# Patient Record
Sex: FEMALE | Race: OTHER | URBAN - METROPOLITAN AREA
[De-identification: names, ages, dates, MRNs, and addresses within clinical notes are randomized per-mention and may not be internally consistent; named-entity substitution may affect disease eponyms.]

---

## 2024-03-19 ENCOUNTER — APPOINTMENT (OUTPATIENT)
Dept: LAB | Facility: HOSPITAL | Age: 39
End: 2024-03-19
Payer: COMMERCIAL

## 2024-03-19 DIAGNOSIS — Z00.00 ROUTINE GENERAL MEDICAL EXAMINATION AT A HEALTH CARE FACILITY: ICD-10-CM

## 2024-03-19 PROCEDURE — 87491 CHLMYD TRACH DNA AMP PROBE: CPT

## 2024-03-19 PROCEDURE — 86480 TB TEST CELL IMMUN MEASURE: CPT

## 2024-03-19 PROCEDURE — 87591 N.GONORRHOEAE DNA AMP PROB: CPT

## 2024-03-19 PROCEDURE — 36415 COLL VENOUS BLD VENIPUNCTURE: CPT

## 2024-03-19 PROCEDURE — 86780 TREPONEMA PALLIDUM: CPT

## 2024-03-20 LAB
C TRACH DNA SPEC QL NAA+PROBE: NEGATIVE
GAMMA INTERFERON BACKGROUND BLD IA-ACNC: 0.04 IU/ML
M TB IFN-G BLD-IMP: NEGATIVE
M TB IFN-G CD4+ BCKGRND COR BLD-ACNC: 0.01 IU/ML
M TB IFN-G CD4+ BCKGRND COR BLD-ACNC: 0.03 IU/ML
MITOGEN IGNF BCKGRD COR BLD-ACNC: 9.96 IU/ML
N GONORRHOEA DNA SPEC QL NAA+PROBE: POSITIVE
TREPONEMA PALLIDUM IGG+IGM AB [PRESENCE] IN SERUM OR PLASMA BY IMMUNOASSAY: NORMAL

## 2024-10-15 ENCOUNTER — OFFICE VISIT (OUTPATIENT)
Age: 39
End: 2024-10-15
Payer: COMMERCIAL

## 2024-10-15 VITALS
RESPIRATION RATE: 18 BRPM | HEIGHT: 63 IN | DIASTOLIC BLOOD PRESSURE: 56 MMHG | WEIGHT: 162.4 LBS | OXYGEN SATURATION: 99 % | HEART RATE: 87 BPM | SYSTOLIC BLOOD PRESSURE: 110 MMHG | BODY MASS INDEX: 28.77 KG/M2

## 2024-10-15 DIAGNOSIS — Z02.4 DRIVER'S PERMIT PE (PHYSICAL EXAMINATION): Primary | ICD-10-CM

## 2024-10-15 NOTE — PROGRESS NOTES
[facility]    NAME: Yvette Siddiqui is a 39 y.o. female  : 1985    MRN: [id number]  DATE: October 15, 2024  TIME: 12:50 PM    Assessment and Plan   's permit PE (physical examination) [Z02.4]  1. 's permit PE (physical examination)          Eye exam meets requirements.  Paperwork filled out.  Educated to follow up with primary care doctor for other concerns.     Patient Instructions     Bring filled out paperwork with you to the [employer].  Follow up with primary doctor for other concerns - if you need a primary doctor can follow up at the office next to the urgent care. To make an appointment call [phone].    Chief Complaint     Chief Complaint   Patient presents with    Permit          History of Present Illness       Presents for permit physical. Denies past medical issues. Specifically denies history of cardiac problems, neurological disorders, seizures, hypertension, diabetes, or alcohol/drug use.         Review of Systems   Review of Systems   Constitutional:  Negative for chills and fever.   HENT:  Negative for congestion and sore throat.    Eyes:  Negative for visual disturbance.   Respiratory:  Negative for cough and shortness of breath.    Cardiovascular:  Negative for chest pain.   Gastrointestinal:  Negative for abdominal pain.   Musculoskeletal:  Negative for myalgias.   Neurological:  Negative for seizures.   Psychiatric/Behavioral:  Negative for confusion.          Current Medications     No current outpatient medications on file.    Current Allergies     Allergies as of 10/15/2024    (Not on File)            The following portions of the patient's history were reviewed and updated as appropriate: allergies, current medications, past family history, past medical history, past social history, past surgical history and problem list.     No past medical history on file.    No past surgical history on file.    No family history on file.      Medications have been  "verified.        Objective   /56   Pulse 87   Resp 18   Ht 5' 3\" (1.6 m)   Wt 73.7 kg (162 lb 6.4 oz)   SpO2 99%   BMI 28.77 kg/m²        Physical Exam     Physical Exam  Vitals reviewed.   Constitutional:       General: She is not in acute distress.     Appearance: Normal appearance.   HENT:      Right Ear: Tympanic membrane, ear canal and external ear normal.      Left Ear: Tympanic membrane, ear canal and external ear normal.      Nose: Nose normal.      Mouth/Throat:      Mouth: Mucous membranes are moist.      Pharynx: No posterior oropharyngeal erythema.   Eyes:      Conjunctiva/sclera: Conjunctivae normal.   Cardiovascular:      Rate and Rhythm: Normal rate and regular rhythm.      Pulses: Normal pulses.      Heart sounds: Normal heart sounds. No murmur heard.  Pulmonary:      Effort: Pulmonary effort is normal. No respiratory distress.      Breath sounds: Normal breath sounds.   Musculoskeletal:         General: Normal range of motion.   Skin:     General: Skin is warm and dry.   Neurological:      General: No focal deficit present.      Mental Status: She is alert and oriented to person, place, and time.      Sensory: Sensation is intact.      Motor: Motor function is intact.      Coordination: Coordination is intact.      Gait: Gait is intact.   Psychiatric:         Mood and Affect: Mood normal.         Behavior: Behavior normal.                    "

## 2024-11-25 LAB
EXTERNAL HEPATITIS B SURFACE ANTIGEN: NON REACTIVE
EXTERNAL HIV-1/2 AB-AG: NORMAL
EXTERNAL RUBELLA IGG QUANTITATION: NORMAL
EXTERNAL SYPHILIS TOTAL IGG/IGM SCREENING: NON REACTIVE
HCV AB SER-ACNC: NON REACTIVE

## 2025-02-12 ENCOUNTER — OFFICE VISIT (OUTPATIENT)
Dept: URGENT CARE | Facility: MEDICAL CENTER | Age: 40
End: 2025-02-12
Payer: COMMERCIAL

## 2025-02-12 VITALS
OXYGEN SATURATION: 96 % | SYSTOLIC BLOOD PRESSURE: 132 MMHG | DIASTOLIC BLOOD PRESSURE: 75 MMHG | BODY MASS INDEX: 30.47 KG/M2 | RESPIRATION RATE: 18 BRPM | WEIGHT: 172 LBS | HEART RATE: 100 BPM | TEMPERATURE: 97.9 F

## 2025-02-12 DIAGNOSIS — J01.40 ACUTE PANSINUSITIS, RECURRENCE NOT SPECIFIED: ICD-10-CM

## 2025-02-12 DIAGNOSIS — J34.89 PURULENT RHINORRHEA: Primary | ICD-10-CM

## 2025-02-12 PROBLEM — Z3A.25 25 WEEKS GESTATION OF PREGNANCY: Status: ACTIVE | Noted: 2024-12-27

## 2025-02-12 PROBLEM — Z98.891 HISTORY OF CESAREAN DELIVERY: Status: ACTIVE | Noted: 2024-11-27

## 2025-02-12 PROBLEM — A54.9 GONORRHEA: Status: ACTIVE | Noted: 2025-02-12

## 2025-02-12 PROBLEM — Z13.32 ENCOUNTER FOR SCREENING FOR MATERNAL DEPRESSION: Status: ACTIVE | Noted: 2024-11-27

## 2025-02-12 PROBLEM — O09.529 ANTEPARTUM MULTIGRAVIDA OF ADVANCED MATERNAL AGE: Status: ACTIVE | Noted: 2024-11-27

## 2025-02-12 PROBLEM — Z30.09 ENCOUNTER FOR CONSULTATION FOR FEMALE STERILIZATION: Status: ACTIVE | Noted: 2024-12-27

## 2025-02-12 PROCEDURE — S9083 URGENT CARE CENTER GLOBAL: HCPCS

## 2025-02-12 PROCEDURE — G0382 LEV 3 HOSP TYPE B ED VISIT: HCPCS

## 2025-02-12 RX ORDER — AMOXICILLIN 875 MG/1
875 TABLET, COATED ORAL 2 TIMES DAILY
Qty: 14 TABLET | Refills: 0 | Status: SHIPPED | OUTPATIENT
Start: 2025-02-12 | End: 2025-02-20

## 2025-02-12 NOTE — PROGRESS NOTES
[facility]        NAME: Yvette Siddiqui is a 39 y.o. female  : 1985    MRN: [id number]  DATE: 2025  TIME: 2:54 PM    Assessment and Plan   Purulent rhinorrhea [J34.89]  1. Purulent rhinorrhea  amoxicillin (AMOXIL) 875 mg tablet      2. Acute pansinusitis, recurrence not specified  amoxicillin (AMOXIL) 875 mg tablet            Patient Instructions   Reassurance provided that Yvette Siddiqui lungs are clear on examination today.    Majority of cases are viral and will not require antibiotic treatment. Given length of symptoms will treat.     Take antibiotics as prescribed. Complete entire course of antibiotics even if feeling better.     Eat yogurt with live and active cultures and/or take a probiotic at least 3 hours before or after antibiotic dose. Monitor stool for diarrhea and/or blood. If this occurs, contact primary care doctor ASAP.     Encourage fluids  Rest    Nasal saline spray, sinus rinse or neti-pot    Rhinocort, Flonase or Nasonex as directed on packaging    OTC Zytrec or Claritin    Guaifenesin    Delsym, Robitussin, Robitussin DM    Recommend the following options: room humidifier, vicks vapo rub, hot/steamy shower (practice proper safety precautions when handing hot liquids/steam)  Offer fluids frequently to help with hydration, as staying hydrated helps loosen up thick mucous.   May drink warm water with honey. May use lemon.    Warm compresses over sinuses  Tylenol for pain/fever.    Encourage coughing into the elbow instead of the hand.   Washing hands frequently with warm water and soap may help stop spread of infection.    If symptoms do not improve, please follow with PCP in 3-5 days for further evaluation.  Proceed to  ER if symptoms worsen.    If tests are performed, our office will contact you with results only if changes need to made to the care plan discussed with you at the visit. [facility]    Chief Complaint      Chief Complaint   Patient presents with    Sinus Problem     Pt. With sinus pressure and congestion for the past 5 days. No fevers. Pt. Is 31 weeks pregnant.          History of Present Illness       Sinus Problem  This is a new problem. The current episode started in the past 7 days. The problem has been gradually worsening since onset. There has been no fever. Associated symptoms include congestion, headaches, sinus pressure and a sore throat. Pertinent negatives include no shortness of breath. Past treatments include saline nose sprays.       Review of Systems   Review of Systems   HENT:  Positive for congestion, sinus pressure and sore throat.    Respiratory:  Negative for shortness of breath.    Neurological:  Positive for headaches.         Current Medications       Current Outpatient Medications:     amoxicillin (AMOXIL) 875 mg tablet, Take 1 tablet (875 mg total) by mouth 2 (two) times a day for 7 days, Disp: 14 tablet, Rfl: 0    Ferrous Sulfate (IRON PO), Take 1 tablet by mouth daily, Disp: , Rfl:     Current Allergies     Allergies as of 02/12/2025    (No Known Allergies)            The following portions of the patient's history were reviewed and updated as appropriate: allergies, current medications, past family history, past medical history, past social history, past surgical history and problem list.     History reviewed. No pertinent past medical history.    History reviewed. No pertinent surgical history.    History reviewed. No pertinent family history.      Medications have been verified.        Objective   /75   Pulse 100   Temp 97.9 °F (36.6 °C)   Resp 18   Wt 78 kg (172 lb)   SpO2 96%   BMI 30.47 kg/m²        Physical Exam     Physical Exam               Infiltration, Once PRN, Nancy Solo MD    metroNIDAZOLE (FLAGYL) IVPB (premix) 500 mg 100 mL, 500 mg, Intravenous, BID, Delfina Lucio MD, Stopped at 25 183    [COMPLETED] NIFEdipine (PROCARDIA) capsule 30 mg, 30 mg, Oral, Once, 30 mg at 25 0131 **FOLLOWED BY** NIFEdipine (PROCARDIA) capsule 10 mg, 10 mg, Oral, Q6H, Delfina Lucio MD    sodium chloride 0.9 % bolus 1,000 mL, 1,000 mL, Intravenous, Once, Delfina Lucio MD    sodium chloride 0.9 % infusion, 125 mL/hr, Intravenous, Continuous, Nancy Solo MD, Last Rate: 125 mL/hr at 25 1006, 125 mL/hr at 25 1006    Current Allergies     Allergies as of 2025    (No Known Allergies)            The following portions of the patient's history were reviewed and updated as appropriate: allergies, current medications, past family history, past medical history, past social history, past surgical history and problem list.     History reviewed. No pertinent past medical history.    Past Surgical History:   Procedure Laterality Date    APPENDECTOMY       SECTION      GALLBLADDER SURGERY      KNEE SURGERY Right        Family History   Problem Relation Age of Onset    Breast cancer Neg Hx     Colon cancer Neg Hx     Ovarian cancer Neg Hx          Medications have been verified.        Objective   /75   Pulse 100   Temp 97.9 °F (36.6 °C)   Resp 18   Wt 78 kg (172 lb)   SpO2 96%   BMI 30.47 kg/m²        Physical Exam     Physical Exam  Vitals and nursing note reviewed.   Constitutional:       General: She is not in acute distress.     Appearance: Normal appearance. She is not ill-appearing, toxic-appearing or diaphoretic.   HENT:      Head: Normocephalic.      Right Ear: Tympanic membrane, ear canal and external ear normal. There is no impacted cerumen.      Left Ear: Tympanic membrane, ear canal and external ear normal. There is no impacted cerumen.      Nose: Mucosal edema, congestion and rhinorrhea present.  Rhinorrhea is purulent.      Right Turbinates: Enlarged and swollen.      Left Turbinates: Enlarged and swollen.      Right Sinus: Maxillary sinus tenderness and frontal sinus tenderness present.      Left Sinus: Maxillary sinus tenderness and frontal sinus tenderness present.      Mouth/Throat:      Mouth: Mucous membranes are moist.      Pharynx: Posterior oropharyngeal erythema (minimal) present.   Cardiovascular:      Rate and Rhythm: Normal rate and regular rhythm.      Pulses: Normal pulses.      Heart sounds: Normal heart sounds. No murmur heard.  Pulmonary:      Effort: Pulmonary effort is normal. No respiratory distress.      Breath sounds: Normal breath sounds. No stridor. No wheezing, rhonchi or rales.   Chest:      Chest wall: No tenderness.   Musculoskeletal:         General: Normal range of motion.   Lymphadenopathy:      Cervical: No cervical adenopathy.   Skin:     General: Skin is warm.   Neurological:      Mental Status: She is alert.

## 2025-02-12 NOTE — PATIENT INSTRUCTIONS
Reassurance provided that Yvette Siddiqui lungs are clear on examination today.    Majority of cases are viral and will not require antibiotic treatment. Given length of symptoms will treat.     Take antibiotics as prescribed. Complete entire course of antibiotics even if feeling better.     Eat yogurt with live and active cultures and/or take a probiotic at least 3 hours before or after antibiotic dose. Monitor stool for diarrhea and/or blood. If this occurs, contact primary care doctor ASAP.     Encourage fluids  Rest    Nasal saline spray, sinus rinse or neti-pot    Rhinocort, Flonase or Nasonex as directed on packaging    OTC Zytrec or Claritin    Guaifenesin    Delsym, Robitussin, Robitussin DM    Recommend the following options: room humidifier, vicks vapo rub, hot/steamy shower (practice proper safety precautions when handing hot liquids/steam)  Offer fluids frequently to help with hydration, as staying hydrated helps loosen up thick mucous.   May drink warm water with honey. May use lemon.    Warm compresses over sinuses  Tylenol for pain/fever.    Encourage coughing into the elbow instead of the hand.   Washing hands frequently with warm water and soap may help stop spread of infection.    If symptoms do not improve, please follow with PCP in 3-5 days for further evaluation.  Proceed to  ER if symptoms worsen.    If tests are performed, our office will contact you with results only if changes need to made to the care plan discussed with you at the visit. You can review your full results on [facility].

## 2025-02-19 PROBLEM — Z3A.30 30 WEEKS GESTATION OF PREGNANCY: Status: ACTIVE | Noted: 2024-12-27

## 2025-02-19 NOTE — PROGRESS NOTES
Diagnoses and all orders for this visit:    Encounter to establish care  -     Ambulatory Referral to Maternal Fetal Medicine; Future  -     POCT urine dip    Encounter for supervision of normal first pregnancy in third trimester    History of  delivery    BV (bacterial vaginosis)  -     metroNIDAZOLE (METROGEL) 0.75 % vaginal gel; Insert 1 Application into the vagina daily at bedtime for 5 days  -     POCT wet mount    Need for Tdap vaccination  -     TDAP VACCINE GREATER THAN OR EQUAL TO 8YO IM    Other orders  -     Breast Pump      Results for orders placed or performed in visit on 25   Breast Pump   Result Value Ref Range    Supplier Name [employer]     Supplier Phone Number [phone]     Order Status Supplier Submitted     Delivery Note      Delivery Request Date 2025     Item Description Breast Pump    POCT wet mount   Result Value Ref Range    Yeast, Wet Prep Neg     pH 5.0     Whiff Test Pos     Clue Cells Pos     Trich, Wet Prep Neg        Return for OB intake, PN1, needs delivery consents signed, MA 31 form for tubal signed     See after visit summary for further information and recommendations to the above mentioned subjects which we may or may not have covered in detail during your visit     Subjective    CC: Transfer of care from [facility] @ 30 weeks LMP 24     Yvette Siddiqui is a 39 y.o. female  presents to establish care, transfering in from [facility] at  33 weeks of pregnancy , 3 previous C sections desires Tubal with this delivery   She is from [facility]  All prenatal labs completed and normal at [facility], 28 week labs were done at 25 weeks NML   20 week Anatomy scan completed at [facility] 25 ANA 25, O+ from [facility]  Last Pap 24 Neg  Hx of gonorrhea tx 2024  Treated for BV in Dec ,still has itching and odor  Taking Iron   Tdap given today   Will Breast feed this baby   Dopplers today 154    No LMP recorded. Patient is pregnant.    Vitals:     "02/20/25 1025   BP: 122/82   BP Location: Left arm   Patient Position: Sitting   Cuff Size: Standard   Weight: 76.7 kg (169 lb)   Height: 5' 3\" (1.6 m)     Body mass index is 29.94 kg/m².    Review of Systems     Constitutional: Negative for chills, fatigue, fever, headaches, visual disturbances, and unexpected weight change.   Respiratory: Negative for cough, & shortness of breath.  Cardiovascular: Negative for chest pain. .    Gastrointestinal: Negative for Abd pain, nausea & vomiting, constipation and diarrhea.   Genitourinary: Negative for difficulty urinating, dysuria, hematuria, dyspareunia, unusual vaginal bleeding   Skin: Negative skin changes    Physical Exam     Constitutional: Alert & Oriented x3, well-developed and well-nourished. No distress.   HENT: Atraumatic, Normocephalic,   Neck: Normal range of motion.   Pulmonary: Effort normal.   Abdominal: Soft. No tenderness or masses  Musculoskeletal: Normal ROM  Skin: Warm & Dry  Psychological: Normal mood, thought content, behavior & judgement       Pelvic exam was performed with patient supine, lithotomy position.      Labia: Negative rash, tenderness, lesion or injury on the right labia.              Negative rash, tenderness, lesion or injury on the left labia.   Urethral meatus:  Negative for  tenderness, inflammation or discharge.   Uterus: not deviated, enlarged, fixed or tender.   Cervix: no discharge or friability.   Right adnexa: no mass, no tenderness and no fullness.  Left adnexa: no mass, no tenderness and no fullness.   Vagina: No erythema, tenderness, masses, or foreign body in the vagina. No signs of injury around the vagina. White milky vaginal discharge   Perineum without lesions, signs of injury, erythema or swelling.  Inguinal Canal:        Right: No inguinal adenopathy or hernia present.        Left: No inguinal adenopathy or hernia present.             "

## 2025-02-20 ENCOUNTER — ULTRASOUND (OUTPATIENT)
Dept: OBGYN CLINIC | Facility: CLINIC | Age: 40
End: 2025-02-20
Payer: COMMERCIAL

## 2025-02-20 VITALS
BODY MASS INDEX: 29.95 KG/M2 | SYSTOLIC BLOOD PRESSURE: 122 MMHG | HEIGHT: 63 IN | WEIGHT: 169 LBS | DIASTOLIC BLOOD PRESSURE: 82 MMHG

## 2025-02-20 DIAGNOSIS — B96.89 BV (BACTERIAL VAGINOSIS): ICD-10-CM

## 2025-02-20 DIAGNOSIS — N76.0 BV (BACTERIAL VAGINOSIS): ICD-10-CM

## 2025-02-20 DIAGNOSIS — Z11.3 SCREENING FOR STD (SEXUALLY TRANSMITTED DISEASE): ICD-10-CM

## 2025-02-20 DIAGNOSIS — Z34.03 ENCOUNTER FOR SUPERVISION OF NORMAL FIRST PREGNANCY IN THIRD TRIMESTER: ICD-10-CM

## 2025-02-20 DIAGNOSIS — Z76.89 ENCOUNTER TO ESTABLISH CARE: Primary | ICD-10-CM

## 2025-02-20 DIAGNOSIS — Z3A.33 33 WEEKS GESTATION OF PREGNANCY: ICD-10-CM

## 2025-02-20 DIAGNOSIS — Z23 NEED FOR TDAP VACCINATION: ICD-10-CM

## 2025-02-20 DIAGNOSIS — Z98.891 HISTORY OF CESAREAN DELIVERY: ICD-10-CM

## 2025-02-20 LAB
BV WHIFF TEST VAG QL: ABNORMAL
CLUE CELLS SPEC QL WET PREP: ABNORMAL
PH SMN: 5 [PH]
SL AMB  POCT GLUCOSE, UA: NEGATIVE
SL AMB POCT URINE PROTEIN: POSITIVE
T VAGINALIS VAG QL WET PREP: ABNORMAL
YEAST VAG QL WET PREP: ABNORMAL

## 2025-02-20 PROCEDURE — 90715 TDAP VACCINE 7 YRS/> IM: CPT | Performed by: OBSTETRICS & GYNECOLOGY

## 2025-02-20 PROCEDURE — 87591 N.GONORRHOEAE DNA AMP PROB: CPT | Performed by: OBSTETRICS & GYNECOLOGY

## 2025-02-20 PROCEDURE — 87210 SMEAR WET MOUNT SALINE/INK: CPT | Performed by: OBSTETRICS & GYNECOLOGY

## 2025-02-20 PROCEDURE — 87491 CHLMYD TRACH DNA AMP PROBE: CPT | Performed by: OBSTETRICS & GYNECOLOGY

## 2025-02-20 PROCEDURE — 90471 IMMUNIZATION ADMIN: CPT | Performed by: OBSTETRICS & GYNECOLOGY

## 2025-02-20 PROCEDURE — 81002 URINALYSIS NONAUTO W/O SCOPE: CPT | Performed by: OBSTETRICS & GYNECOLOGY

## 2025-02-20 PROCEDURE — 99204 OFFICE O/P NEW MOD 45 MIN: CPT | Performed by: OBSTETRICS & GYNECOLOGY

## 2025-02-20 RX ORDER — METRONIDAZOLE 7.5 MG/G
1 GEL VAGINAL
Qty: 45 G | Refills: 0 | Status: ON HOLD | OUTPATIENT
Start: 2025-02-20 | End: 2025-02-25

## 2025-02-20 NOTE — PROGRESS NOTES
Pt is currently pregnant. Here to establish care with [facility], transfer from [place]   , csection x3   33wks   LMP 24  ANA 4/10/25  No LOF,VB,Contractions   +FM   Urine trace protein/neg glucose   UTD flu vaccine   Tdap given today/pt tolerated VIS given   Pn1/28wks labs completed   O+   Last Pap 24 NILM/HPV -  GC/CHL neg/neg 24   Recollected today patient experiencing discharge and odor   Breast Pump ordered today   3 prior csections interested in tubal ,MA31 signed w/ [facility] on  will sign with our office   Blue folder given/reviewed w/ patient

## 2025-02-20 NOTE — PATIENT INSTRUCTIONS
Patient Education     Pregnancy - The Eighth Month   About this topic   It is important for you to learn how to take care of yourself to help you have a healthy baby and safe delivery. It is good to have health care throughout your pregnancy.  The eighth month of your pregnancy starts around week 33 and lasts through week 36. By knowing how far along you are, you can learn what is normal for this stage of your pregnancy and plan for what is next.  General   Your body   During the eighth month of your pregnancy, here are some things you can expect.  You may:  Be feeling more tired. Rest as much as you can and take small naps if possible. This also helps with swollen feet and ankles.  Feel hot all the time. Be sure to drink plenty of water.  Notice your baby drops more into your pelvis. This makes breathing a bit easier, but you may have to go to the bathroom more often. You may also notice more problems with hemorrhoids.  Have more back pain  Notice clear jelly-like substance flecked with blood when you use the restroom. This is your mucus plug.  Feel less steady on your feet. This is because your hips and joints are preparing for birth.  Be tested for Group Beta Strep (GBS) to see if you will need antibiotics during labor  Gain about 1/2 to 1 pound (.23 to .45 kg) a week for the rest of your pregnancy. It is normal to gain about 5 to 10 pounds (2.3 to 4.5 kg) total in your first 4 months.  Have a BPP, or Biophysical Profile. The doctors do an ultrasound to check your baby’s health if you are at high risk or having problems.  Have a NST, or Non Stress Test. The staff place special monitors around your belly to check the baby’s heart rate and look for contractions.  Your baby's growth and development:  Your baby is almost fully mature but will spend the rest of the time inside of you getting bigger.  Their lungs are the last organ to mature, so it is important that your baby stays in your womb until your due date if  possible.  Their bones are getting stronger each day. The bones in their skull stay a little softer to make delivery easier.  They are able to scratch themselves as their fingernails are developed.  Your baby is becoming protected from germs as they develop antibodies.  They are moving a little less because there is less room.  Your baby is about 19 inches (48 cm) long and weighs about 6 pounds (2,700 gm). Your baby is about the size of a papaya or pineapple.  Things to Think About   Avoid alcohol, drugs, tobacco products, and second hand smoke.  Be sure you know the signs of labor and when to call your doctor.  Are you planning a natural childbirth or thinking about an epidural? Know things you can do to help cope with labor pain.  You may want to learn about cord blood banking.  Do you have everything you need for after the baby is born? Be sure the car seat fits in the car.  When do I need to call the doctor?   Contractions every 10 minutes or more often that do not go away with drinking water or position changes  Headache that does not go away; blurry vision; seeing spots or halos; increase in swelling in your hands, feet, or face; and pain under your ribs on the right side  Low, dull back pain that does not go away  Pressure in your pelvis that feels like your baby is pushing down  A gush or constant trickle of watery or bloody fluid leaking from your vagina  Little to no movement felt by baby in 2 hours. Your baby should be moving at least 10 times every 2 hours.  Vaginal bleeding with or without pain  Fever of 100.4°F (38°C) or higher  After a car accident, fall, or any trauma to your belly  Having thoughts of harming yourself or others, or do not feel safe at home  Last Reviewed Date   2020-05-06  Consumer Information Use and Disclaimer   This generalized information is a limited summary of diagnosis, treatment, and/or medication information. It is not meant to be comprehensive and should be used as a tool  "to help the user understand and/or assess potential diagnostic and treatment options. It does NOT include all information about conditions, treatments, medications, side effects, or risks that may apply to a specific patient. It is not intended to be medical advice or a substitute for the medical advice, diagnosis, or treatment of a health care provider based on the health care provider's examination and assessment of a patient’s specific and unique circumstances. Patients must speak with a health care provider for complete information about their health, medical questions, and treatment options, including any risks or benefits regarding use of medications. This information does not endorse any treatments or medications as safe, effective, or approved for treating a specific patient. UpToDate, Inc. and its affiliates disclaim any warranty or liability relating to this information or the use thereof. The use of this information is governed by the Terms of Use, available at https://www.[employer]/en/know/clinical-effectiveness-terms   Copyright   Copyright © 2024 UpToDate, Inc. and its affiliates and/or licensors. All rights reserved.  Patient Education     Your baby's movement before birth   The Basics   Written by the doctors and editors at [email or web address]   When should I start feeling my baby move? -- It depends. Most people first feel their baby moving in the uterus between about 16 and 20 weeks of pregnancy. It might take longer to feel movement if this is your first pregnancy or if the placenta is in the front of your uterus.  What kinds of movements should I feel? -- When you first feel your baby move, it might feel like a gentle flutter in your belly. This is sometimes called \"quickening.\" As the baby grows, their movements will get stronger. You will probably feel them kicking, rolling, and stretching. Later in pregnancy, you might be able to see and feel the baby moving from the outside.  You might notice " "that your baby is more active at certain times of the day or night. Even before birth, babies have periods of being asleep and awake. When your baby is sleeping, you might notice that they do not move as much.  Should I keep track of my baby's movements? -- If your pregnancy is healthy, you probably do not need to count or record your baby's movements. Feeling regular movement is a good sign that the baby is doing well.  In some cases, your doctor or midwife might ask you to keep track of your baby's movements. If so, they will tell you how to do this and when to call them.  A change in your baby's movements does not always mean that there is a problem. But in some cases, it can be a sign that the baby is having trouble. If your doctor or midwife is concerned, they can do tests to check on the baby.  If I am asked to track movement, how should I do it? -- There are different ways of tracking your baby's movement. This is sometimes called \"kick counting.\"  Your doctor or midwife will tell you exactly what to track. For example, they might ask you to write down:   How long it takes to feel 10 kicks or movements   How many times your baby moves in 1 hour  Many experts consider at least 10 movements in 2 hours to be a sign that the baby is doing well. But there is no specific cutoff for exactly how much movement is healthy or unhealthy. Some babies are more active than others, and some pregnant people feel movement more easily than others. The main goal of kick counting is to get to know your baby's normal patterns so you can tell if anything changes.  If you are doing kick counting:   Choose a time of day when your baby is usually active.   Find a quiet place where you will not be distracted.   Lie down on your side in a comfortable position.   Check the clock, or set a timer.   Each time you feel your baby move or kick, write down the time. Some people use a smartphone annel to keep track.   If your baby seems less " active than usual, try moving around, eating a snack, and emptying your bladder. This can help wake the baby up if they are asleep.   Stop counting after you have felt 10 kicks, or after the length of time your doctor or midwife told you.  When should I call the doctor? -- Call your doctor or midwife for advice if:   You have concerns about your baby's movement.   Your baby is moving less than they normally do.   You notice a sudden change in the pattern of your baby's movements.   You have any other symptoms that worry you.  All topics are updated as new evidence becomes available and our peer review process is complete.  This topic retrieved from [email or web address] on: Feb 26, 2024.  Topic 019705 Version 1.0  Release: 32.2.4 - C32.56  © 2024 UpToDate, Inc. and/or its affiliates. All rights reserved.  Consumer Information Use and Disclaimer   Disclaimer: This generalized information is a limited summary of diagnosis, treatment, and/or medication information. It is not meant to be comprehensive and should be used as a tool to help the user understand and/or assess potential diagnostic and treatment options. It does NOT include all information about conditions, treatments, medications, side effects, or risks that may apply to a specific patient. It is not intended to be medical advice or a substitute for the medical advice, diagnosis, or treatment of a health care provider based on the health care provider's examination and assessment of a patient's specific and unique circumstances. Patients must speak with a health care provider for complete information about their health, medical questions, and treatment options, including any risks or benefits regarding use of medications. This information does not endorse any treatments or medications as safe, effective, or approved for treating a specific patient. UpToDate, Inc. and its affiliates disclaim any warranty or liability relating to this information or the use thereof.The use  of this information is governed by the Terms of Use, available at https://www.[employer].com/en/know/clinical-effectiveness-terms. © UpToDate, Inc. and its affiliates and/or licensors. All rights reserved.  Copyright   ©  UpToDate, Inc. and/or its affiliates. All rights reserved.  Patient Education      Birth Discharge Instructions   About this topic    birth is a surgical procedure used to deliver a baby. It is also called a . The baby is taken out through a cut in the belly. A  may be planned or unplanned.     What care is needed at home?   Ask your doctor what you need to do when you go home. Make sure you ask questions if you do not understand what the doctor says. This way you will know what you need to do.  You can most often go home 2 to 4 days after the . It may take 6 weeks before you fully recover.  Talk to your doctor about how to care for your cut site. Ask your doctor about:  When you should change your bandages  How to care for your cut sites  When you may take a bath or shower  If you need to be careful with lifting things over the weight of your baby. Do not lift older children. Let the child climb into your lap.  When you may go back to your normal activities like work, driving, or sex  Always wash your hands before and after touching your cut site.  You can expect some bleeding from your vagina for a few weeks. You may use pads but not tampons.  Your bowel movements may take some time to get back to a normal pace. Eat small meals high in fiber to avoid hard stools. Your doctor may tell you to take stool softeners.  Having a  will not affect your plan to breastfeed. You can start breastfeeding right away.  What follow-up care is needed?   Your doctor may ask you to make visits to the office to check on your progress. Be sure to keep these visits.  You may have stitches or staples at your cut site. Some stitches dissolve on their own.  Others need to be taken out. Talk to your doctor to find out about your stitches or staples. If the doctor used skin glue, the glue will fall off on its own.  What drugs may be needed?   The doctor may order drugs to:  Help with pain  Fight an infection  Soften bowel movements  Help with hemorrhoids  Will physical activity be limited?   You may have to limit your activity. Talk to your doctor about the right amount of activity for you.  What problems could happen?   Infection  Wound opening  Heavy blood loss  Blood clots in your legs or lungs  Upset stomach, throwing up, and very bad headache  Low mood  Cramping and discomfort  When do I need to call the doctor?   Signs of infection. These include a fever of 100.4°F (38°C) or higher, chills, pain with passing urine, wound that will not heal.  Sudden shortness of breath or a sudden onset of chest pain could be a sign that a blood clot has traveled to your lungs. Go to the ER right away.  Signs of wound infection. These include swelling, redness, warmth around the wound; too much pain when touched; yellowish, greenish, or bloody discharge; foul smell coming from the cut site; cut site opens up.  Problems with pain that does not go away or gets worse  Swollen, hard, or painful breasts  You feel very sad or depressed  Problems passing urine or with hard bowel movements  Sudden, large amounts of vaginal bleeding  Swelling in your hands, eyes, or face  Severe headache or vision changes  Helpful tips   Use a small pillow to put pressure on your cut site. This can make you more comfortable when you cough, laugh, or do other actions.  Have people help with house work, cooking, and watching the baby. This will let you get lots of rest.  Make time for you and your partner to be alone and talk.  Make time for you and your partner to enjoy your baby.  Teach Back: Helping You Understand   The Teach Back Method helps you understand the information we are giving you. After you talk  with the staff, tell them in your own words what you learned. This helps to make sure the staff has described each thing clearly. It also helps to explain things that may have been confusing. Before going home, make sure you can do these:  I can tell you about my procedure.  I can tell you how to care for my cut site.  I can tell you what I will do if I have large amounts of vaginal bleeding, swollen or painful breasts, or feel very sad or have a low mood.  Last Reviewed Date   2020-10-12  Consumer Information Use and Disclaimer   This generalized information is a limited summary of diagnosis, treatment, and/or medication information. It is not meant to be comprehensive and should be used as a tool to help the user understand and/or assess potential diagnostic and treatment options. It does NOT include all information about conditions, treatments, medications, side effects, or risks that may apply to a specific patient. It is not intended to be medical advice or a substitute for the medical advice, diagnosis, or treatment of a health care provider based on the health care provider's examination and assessment of a patient’s specific and unique circumstances. Patients must speak with a health care provider for complete information about their health, medical questions, and treatment options, including any risks or benefits regarding use of medications. This information does not endorse any treatments or medications as safe, effective, or approved for treating a specific patient. UpToDate, Inc. and its affiliates disclaim any warranty or liability relating to this information or the use thereof. The use of this information is governed by the Terms of Use, available at https://www.wolterskluwer.com/en/know/clinical-effectiveness-terms   Copyright   Copyright © 2024 UpToDate, Inc. and its affiliates and/or licensors. All rights reserved.

## 2025-02-21 ENCOUNTER — RESULTS FOLLOW-UP (OUTPATIENT)
Dept: OBGYN CLINIC | Facility: CLINIC | Age: 40
End: 2025-02-21

## 2025-02-21 LAB
C TRACH DNA SPEC QL NAA+PROBE: NEGATIVE
DME PARACHUTE DELIVERY DATE REQUESTED: NORMAL
DME PARACHUTE ITEM DESCRIPTION: NORMAL
DME PARACHUTE ORDER STATUS: NORMAL
DME PARACHUTE SUPPLIER NAME: NORMAL
DME PARACHUTE SUPPLIER PHONE: NORMAL
N GONORRHOEA DNA SPEC QL NAA+PROBE: NEGATIVE

## 2025-02-23 ENCOUNTER — HOSPITAL ENCOUNTER (INPATIENT)
Facility: HOSPITAL | Age: 40
LOS: 6 days | Discharge: HOME | End: 2025-03-02
Attending: OBSTETRICS & GYNECOLOGY | Admitting: OBSTETRICS & GYNECOLOGY
Payer: COMMERCIAL

## 2025-02-23 DIAGNOSIS — Z98.891 S/P REPEAT LOW TRANSVERSE C-SECTION: ICD-10-CM

## 2025-02-23 DIAGNOSIS — Z3A.33 33 WEEKS GESTATION OF PREGNANCY: ICD-10-CM

## 2025-02-23 DIAGNOSIS — O42.919 PRETERM PREMATURE RUPTURE OF MEMBRANES (PPROM) WITH UNKNOWN ONSET OF LABOR: ICD-10-CM

## 2025-02-23 DIAGNOSIS — Z98.891 HISTORY OF C-SECTION: ICD-10-CM

## 2025-02-23 DIAGNOSIS — Z98.891 HISTORY OF CESAREAN DELIVERY: Primary | ICD-10-CM

## 2025-02-24 PROBLEM — O42.919 PRETERM PREMATURE RUPTURE OF MEMBRANES (PPROM) DELIVERED, CURRENT HOSPITALIZATION: Status: ACTIVE | Noted: 2025-02-24

## 2025-02-24 PROBLEM — N76.0 VAGINITIS: Status: ACTIVE | Noted: 2025-02-24

## 2025-02-24 PROBLEM — O47.00 PRETERM CONTRACTIONS: Status: ACTIVE | Noted: 2025-02-24

## 2025-02-24 LAB
ABO GROUP BLD: NORMAL
ABO GROUP BLD: NORMAL
BASOPHILS # BLD AUTO: 0.04 THOUSANDS/ÂΜL (ref 0–0.1)
BASOPHILS NFR BLD AUTO: 0 % (ref 0–1)
BLD GP AB SCN SERPL QL: NEGATIVE
C TRACH DNA SPEC QL NAA+PROBE: NEGATIVE
EOSINOPHIL # BLD AUTO: 0.14 THOUSAND/ÂΜL (ref 0–0.61)
EOSINOPHIL NFR BLD AUTO: 2 % (ref 0–6)
ERYTHROCYTE [DISTWIDTH] IN BLOOD BY AUTOMATED COUNT: 13.1 % (ref 11.6–15.1)
HCT VFR BLD AUTO: 33.1 % (ref 34.8–46.1)
HGB BLD-MCNC: 10.7 G/DL (ref 11.5–15.4)
HOLD SPECIMEN: NORMAL
IMM GRANULOCYTES # BLD AUTO: 0.04 THOUSAND/UL (ref 0–0.2)
IMM GRANULOCYTES NFR BLD AUTO: 0 % (ref 0–2)
LYMPHOCYTES # BLD AUTO: 2.2 THOUSANDS/ÂΜL (ref 0.6–4.47)
LYMPHOCYTES NFR BLD AUTO: 23 % (ref 14–44)
MCH RBC QN AUTO: 27.6 PG (ref 26.8–34.3)
MCHC RBC AUTO-ENTMCNC: 32.3 G/DL (ref 31.4–37.4)
MCV RBC AUTO: 85 FL (ref 82–98)
MONOCYTES # BLD AUTO: 0.7 THOUSAND/ÂΜL (ref 0.17–1.22)
MONOCYTES NFR BLD AUTO: 7 % (ref 4–12)
N GONORRHOEA DNA SPEC QL NAA+PROBE: NEGATIVE
NEUTROPHILS # BLD AUTO: 6.3 THOUSANDS/ÂΜL (ref 1.85–7.62)
NEUTS SEG NFR BLD AUTO: 68 % (ref 43–75)
NRBC BLD AUTO-RTO: 0 /100 WBCS
PLATELET # BLD AUTO: 459 THOUSANDS/UL (ref 149–390)
PMV BLD AUTO: 9.1 FL (ref 8.9–12.7)
RBC # BLD AUTO: 3.88 MILLION/UL (ref 3.81–5.12)
RH BLD: POSITIVE
RH BLD: POSITIVE
SPECIMEN EXPIRATION DATE: NORMAL
TREPONEMA PALLIDUM IGG+IGM AB [PRESENCE] IN SERUM OR PLASMA BY IMMUNOASSAY: NORMAL
WBC # BLD AUTO: 9.42 THOUSAND/UL (ref 4.31–10.16)

## 2025-02-24 PROCEDURE — 99223 1ST HOSP IP/OBS HIGH 75: CPT | Performed by: OBSTETRICS & GYNECOLOGY

## 2025-02-24 PROCEDURE — 86901 BLOOD TYPING SEROLOGIC RH(D): CPT

## 2025-02-24 PROCEDURE — 76816 OB US FOLLOW-UP PER FETUS: CPT | Performed by: OBSTETRICS & GYNECOLOGY

## 2025-02-24 PROCEDURE — 86850 RBC ANTIBODY SCREEN: CPT

## 2025-02-24 PROCEDURE — 99222 1ST HOSP IP/OBS MODERATE 55: CPT | Performed by: OBSTETRICS & GYNECOLOGY

## 2025-02-24 PROCEDURE — 86780 TREPONEMA PALLIDUM: CPT

## 2025-02-24 PROCEDURE — 87086 URINE CULTURE/COLONY COUNT: CPT

## 2025-02-24 PROCEDURE — 85025 COMPLETE CBC W/AUTO DIFF WBC: CPT

## 2025-02-24 PROCEDURE — 87150 DNA/RNA AMPLIFIED PROBE: CPT

## 2025-02-24 PROCEDURE — 59025 FETAL NON-STRESS TEST: CPT | Performed by: OBSTETRICS & GYNECOLOGY

## 2025-02-24 PROCEDURE — 87491 CHLMYD TRACH DNA AMP PROBE: CPT

## 2025-02-24 PROCEDURE — 86900 BLOOD TYPING SEROLOGIC ABO: CPT

## 2025-02-24 PROCEDURE — 99214 OFFICE O/P EST MOD 30 MIN: CPT

## 2025-02-24 PROCEDURE — 87591 N.GONORRHOEAE DNA AMP PROB: CPT

## 2025-02-24 PROCEDURE — G0463 HOSPITAL OUTPT CLINIC VISIT: HCPCS

## 2025-02-24 RX ORDER — AMOXICILLIN 250 MG/1
500 CAPSULE ORAL EVERY 8 HOURS SCHEDULED
Status: DISCONTINUED | OUTPATIENT
Start: 2025-02-25 | End: 2025-02-27

## 2025-02-24 RX ORDER — BETAMETHASONE SODIUM PHOSPHATE AND BETAMETHASONE ACETATE 3; 3 MG/ML; MG/ML
12 INJECTION, SUSPENSION INTRA-ARTICULAR; INTRALESIONAL; INTRAMUSCULAR; SOFT TISSUE EVERY 24 HOURS
Status: COMPLETED | OUTPATIENT
Start: 2025-02-24 | End: 2025-02-25

## 2025-02-24 RX ORDER — AMOXICILLIN 250 MG/1
500 CAPSULE ORAL EVERY 8 HOURS SCHEDULED
Status: DISCONTINUED | OUTPATIENT
Start: 2025-02-26 | End: 2025-02-24

## 2025-02-24 RX ORDER — NIFEDIPINE 10 MG/1
10 CAPSULE ORAL EVERY 6 HOURS
Status: DISCONTINUED | OUTPATIENT
Start: 2025-02-24 | End: 2025-02-25

## 2025-02-24 RX ORDER — NIFEDIPINE 10 MG/1
30 CAPSULE ORAL ONCE
Status: COMPLETED | OUTPATIENT
Start: 2025-02-24 | End: 2025-02-24

## 2025-02-24 RX ORDER — SODIUM CHLORIDE 9 MG/ML
125 INJECTION, SOLUTION INTRAVENOUS CONTINUOUS
Status: DISCONTINUED | OUTPATIENT
Start: 2025-02-24 | End: 2025-02-24

## 2025-02-24 RX ORDER — BUPIVACAINE HYDROCHLORIDE 2.5 MG/ML
30 INJECTION, SOLUTION EPIDURAL; INFILTRATION; INTRACAUDAL ONCE AS NEEDED
Status: DISCONTINUED | OUTPATIENT
Start: 2025-02-24 | End: 2025-02-27

## 2025-02-24 RX ORDER — FLUCONAZOLE 100 MG/1
200 TABLET ORAL ONCE
Status: COMPLETED | OUTPATIENT
Start: 2025-02-24 | End: 2025-02-24

## 2025-02-24 RX ORDER — METRONIDAZOLE 500 MG/100ML
500 INJECTION, SOLUTION INTRAVENOUS 2 TIMES DAILY
Status: DISCONTINUED | OUTPATIENT
Start: 2025-02-24 | End: 2025-02-24

## 2025-02-24 RX ORDER — METRONIDAZOLE 500 MG/1
500 TABLET ORAL EVERY 12 HOURS SCHEDULED
Status: DISCONTINUED | OUTPATIENT
Start: 2025-02-25 | End: 2025-03-03 | Stop reason: HOSPADM

## 2025-02-24 RX ADMIN — FLUCONAZOLE 200 MG: 100 TABLET ORAL at 03:45

## 2025-02-24 RX ADMIN — AMPICILLIN SODIUM 2000 MG: 2 INJECTION, POWDER, FOR SOLUTION INTRAMUSCULAR; INTRAVENOUS at 03:20

## 2025-02-24 RX ADMIN — METRONIDAZOLE 500 MG: 500 INJECTION, SOLUTION INTRAVENOUS at 06:44

## 2025-02-24 RX ADMIN — NIFEDIPINE 30 MG: 10 CAPSULE ORAL at 01:31

## 2025-02-24 RX ADMIN — METRONIDAZOLE 500 MG: 500 INJECTION, SOLUTION INTRAVENOUS at 18:06

## 2025-02-24 RX ADMIN — AZITHROMYCIN MONOHYDRATE 500 MG: 500 INJECTION, POWDER, LYOPHILIZED, FOR SOLUTION INTRAVENOUS at 01:02

## 2025-02-24 RX ADMIN — SODIUM CHLORIDE 125 ML/HR: 0.9 INJECTION, SOLUTION INTRAVENOUS at 10:06

## 2025-02-24 RX ADMIN — SODIUM CHLORIDE 125 ML/HR: 0.9 INJECTION, SOLUTION INTRAVENOUS at 01:02

## 2025-02-24 RX ADMIN — AZITHROMYCIN MONOHYDRATE 500 MG: 500 INJECTION, POWDER, LYOPHILIZED, FOR SOLUTION INTRAVENOUS at 05:02

## 2025-02-24 RX ADMIN — AMPICILLIN SODIUM 2000 MG: 2 INJECTION, POWDER, FOR SOLUTION INTRAMUSCULAR; INTRAVENOUS at 22:35

## 2025-02-24 RX ADMIN — AMPICILLIN SODIUM 2000 MG: 2 INJECTION, POWDER, FOR SOLUTION INTRAMUSCULAR; INTRAVENOUS at 10:07

## 2025-02-24 RX ADMIN — AMPICILLIN SODIUM 2000 MG: 2 INJECTION, POWDER, FOR SOLUTION INTRAMUSCULAR; INTRAVENOUS at 16:09

## 2025-02-24 RX ADMIN — BETAMETHASONE SODIUM PHOSPHATE AND BETAMETHASONE ACETATE 12 MG: 3; 3 INJECTION, SUSPENSION INTRA-ARTICULAR; INTRALESIONAL; INTRAMUSCULAR at 01:01

## 2025-02-24 NOTE — PLAN OF CARE
Problem: PAIN - ADULT  Goal: Verbalizes/displays adequate comfort level or baseline comfort level  Description: Interventions:  - Encourage patient to monitor pain and request assistance  - Assess pain using appropriate pain scale  - Administer analgesics based on type and severity of pain and evaluate response  - Implement non-pharmacological measures as appropriate and evaluate response  - Consider cultural and social influences on pain and pain management  - Notify physician/advanced practitioner if interventions unsuccessful or patient reports new pain  Outcome: Progressing     Problem: INFECTION - ADULT  Goal: Absence or prevention of progression during hospitalization  Description: INTERVENTIONS:  - Assess and monitor for signs and symptoms of infection  - Monitor lab/diagnostic results  - Monitor all insertion sites, i.e. indwelling lines, tubes, and drains  - Monitor endotracheal if appropriate and nasal secretions for changes in amount and color  - [place] appropriate cooling/warming therapies per order  - Administer medications as ordered  - Instruct and encourage patient and family to use good hand hygiene technique  - Identify and instruct in appropriate isolation precautions for identified infection/condition  Outcome: Progressing  Goal: Absence of fever/infection during neutropenic period  Description: INTERVENTIONS:  - Monitor WBC    Outcome: Progressing     Problem: SAFETY ADULT  Goal: Patient will remain free of falls  Description: INTERVENTIONS:  - Educate patient/family on patient safety including physical limitations  - Instruct patient to call for assistance with activity   - Consult OT/PT to assist with strengthening/mobility   - Keep Call bell within reach  - Keep bed low and locked with side rails adjusted as appropriate  - Keep care items and personal belongings within reach  - Initiate and maintain comfort rounds  - Make Fall Risk Sign visible to staff  - Apply yellow socks and bracelet  for high fall risk patients  - Consider moving patient to room near nurses station  Outcome: Progressing  Goal: Maintain or return to baseline ADL function  Description: INTERVENTIONS:  -  Assess patient's ability to carry out ADLs; assess patient's baseline for ADL function and identify physical deficits which impact ability to perform ADLs (bathing, care of mouth/teeth, toileting, grooming, dressing, etc.)  - Assess/evaluate cause of self-care deficits   - Assess range of motion  - Assess patient's mobility; develop plan if impaired  - Assess patient's need for assistive devices and provide as appropriate  - Encourage maximum independence but intervene and supervise when necessary  - Involve family in performance of ADLs  - Assess for home care needs following discharge   - Consider OT consult to assist with ADL evaluation and planning for discharge  - Provide patient education as appropriate  Outcome: Progressing  Goal: Maintains/Returns to pre admission functional level  Description: INTERVENTIONS:  - Perform AM-PAC 6 Click Basic Mobility/ Daily Activity assessment daily.  - Set and communicate daily mobility goal to care team and patient/family/caregiver.   - Collaborate with rehabilitation services on mobility goals if consulted  - Out of bed for toileting  - Record patient progress and toleration of activity level   Outcome: Progressing     Problem: DISCHARGE PLANNING  Goal: Discharge to home or other facility with appropriate resources  Description: INTERVENTIONS:  - Identify barriers to discharge w/patient and caregiver  - Arrange for needed discharge resources and transportation as appropriate  - Identify discharge learning needs (meds, wound care, etc.)  - Arrange for interpretive services to assist at discharge as needed  - Refer to Case Management Department for coordinating discharge planning if the patient needs post-hospital services based on physician/advanced practitioner order or complex needs  related to functional status, cognitive ability, or social support system  Outcome: Progressing     Problem: Knowledge Deficit  Goal: Patient/family/caregiver demonstrates understanding of disease process, treatment plan, medications, and discharge instructions  Description: Complete learning assessment and assess knowledge base.  Interventions:  - Provide teaching at level of understanding  - Provide teaching via preferred learning methods  Outcome: Progressing     Problem: ANTEPARTUM  Goal: Maintain pregnancy as long as maternal and/or fetal condition is stable  Description: INTERVENTIONS:  - Maternal surveillance  - Fetal surveillance  - Monitor uterine activity  - Medications as ordered  - Bedrest  Outcome: Progressing

## 2025-02-24 NOTE — ASSESSMENT & PLAN NOTE
Hx of recurrent bacterial vaginosis   Clue cells on wet mt on admission 2/23 as well as budding yeast      Plan:   Bacterial vaginosis: Metronidazole 500mg BID X 7 days   Yeast infection: 1 dose Diflucan 200mg

## 2025-02-24 NOTE — OB LABOR/OXYTOCIN SAFETY PROGRESS
Osvaldo Siddiqui 39 y.o. female MRN: [id number]    Unit/Bed#: LD TRIAGE 4-01 Encounter: [id number]                Cervical Dilation: 1        Cervical Effacement: 50  Fetal Station: -3     Fetal Heart Rate (FHT): 145 BPM  FHR Category: CAT I                Vital Signs:   Vitals:    02/24/25 0015   BP: 111/59   Pulse: 92   Resp: 16   Temp: 98.4 °F (36.9 °C)   SpO2:        Notes/comments:   SVE remains unchanged and patient reports her contractions have been less frequent. Microscopy performed and positive for clue cells and yeast, plan to start treating with metronidazole and diflucan. Continue continuous fetal monitoring and move upstairs to antepartum unit. Plan for MFM consult tomorrow for growth scan       Dr. Solo aware         Delfina Lucio MD 2/24/2025 2:25 AM

## 2025-02-24 NOTE — ASSESSMENT & PLAN NOTE
Presented to triage at 33w3d w/  complaint of leakage of fluid and contractions  Pelvic exam : grossly ruptured with positive pooling, nitrazine positive, negative ferning  Wet mt/microscopy: positive for clue cells, budding yeast  Otherwise hemodynamically stable with no signs of chorioamnionitis , fundal tenderness, or fever  No signs of abruption   S/p loading dose of Procardia, tocolysis held thereafter  Speculum exam on 2/25 not concerning for infection or labor    Plan:   - Latency antibiotics x 7 days: 1g Azithromycin x 1 + 2g Ampicillin Q6h IV x 48 hours followed by Amoxicillin 500mg PO TID for 5 days  - Betamethasone for fetal lung maturation: 2/24-2/25  - Infectious workup: GBS  collected, Urine culture collected and pending, GC negative on 2/24  - Delivery at 34 weeks, sooner if clinically indicated

## 2025-02-24 NOTE — ASSESSMENT & PLAN NOTE
All prenatal labs completed and normal at [facility], 28 week labs were done at 25 weeks NML   20 week Anatomy scan completed at [facility] 25 ANA 25, [facility]   SVE as admission 3> unchanged at recheck 2 hrs after admission    Plan:   Growth Scan   Delivery plan : repeat  section delivery at 34wks ( see PPROM prob list) pending  scheduling for 2025  NICU consulted and made aware at admission   Continuous fetal monitoring, consider NST TID is CAT I is sustained and no concern for progression into labor

## 2025-02-24 NOTE — ASSESSMENT & PLAN NOTE
SVE 1/50/-3 and unchanged at 2 hr recheck on admission  S/P procardia 30mg bolus, discontinued thereafter  Conde: contraction Q 3-4     Plan:   Reg diet  See plan under PPROM

## 2025-02-24 NOTE — ASSESSMENT & PLAN NOTE
Hx ofrecurrent bacterial vaginosis   Clue cells on wet mt on admission 2/23 as well as budding yeast      Plan:   Bacterial vaginosis: Metronidazole 500mg BID X 7 days   Yeast infection: 1 dose Diflucan 200mg

## 2025-02-24 NOTE — CONSULTS
Consultation - Maternal Fetal Medicine   Yvette Siddiqui 39 y.o. female MRN: [id number]  Unit/Bed#: -01 Encounter: [id number]  Admit date: 2025.  Today's date: 25    Assessment & Plan   Ms. Siddiqui is a 39 y.o. year-old  at 33w4d, Hospital Day: 2, admitted for PPROM and  contractions in the setting of three prior  section deliveries.  By issue:    *  premature rupture of membranes (PPROM)  Assessment & Plan  Presented to triage at 33w3d w/  complaint of leakage of fluid and contractions  Pelvic exam : grossly ruptured with positive pooling, nitrazine positive, negative ferning  Wet mt/microscopy: positive for clue cells, budding yeast  Otherwise hemodynamically stable with no signs of chorioamnionitis , fundal tenderness, or fever  No signs of abruption       Plan:   - Latency antibiotics x 7 days: 1g Azithromycine x 1 + 2g Ampicillin Q6h IV x 48 hours followed by Amoxicillin 500mg PO TID for 5 days  - Betamethasone for fetal lung maturation: 12mg q24h x 2 doses  - Infectious workup: GBS to be collected, Urine culture collected and pending, GC negative on   - Delivery at 34 weeks, sooner if clinically indicated       Early gestational age at membrane rupture and low residual amniotic fluid volume are the primary determinants of the incidence of pulmonary hypoplasia  We discussed that oligohydramnios also can result in fetal deformations, including Potter-like facies (eg, low-set ears and epicanthal folds) and limb contractures or other positioning abnormalities  If she is indeed PPROM'd, 40-50% of patients with periviable PROM will give birth within the first week and approximately 70-80% will give birth within 2-5 weeks after membrane rupture     contractions  Assessment & Plan  SVE /-3 and unchanged at 2 hr recheck on admission  S/P procardia 30mg bolus and currently receiving Q6 for the next 48 hours  [place]: contraction Q 3-4     Plan:   Continue  Procardia for Q 48 hrs  until patient is steroid complete unless patient has signs of chorioamnionitis, progressing into labor or abruption which would be contraindications to Tocolysis  Continue NPO diet in the even that patient progresses and requires urgent delivery today,     Vaginitis  Assessment & Plan  Hx ofrecurrent bacterial vaginosis   Clue cells on wet mt on admission  as well as budding yeast      Plan:   Bacterial vaginosis: Metronidazole 500mg BID X 7 days   Yeast infection: 1 dose Diflucan 200mg      History of  delivery  Assessment & Plan   X 3  Desires RTLCS      Encounter for consultation for female sterilization  Assessment & Plan  Desires permanent sterilization at time of RTLCS,       Plan:   Sign MA 31    33 weeks gestation of pregnancy  Assessment & Plan  All prenatal labs completed and normal at [facility], 28 week labs were done at 25 weeks NML   20 week Anatomy scan completed at [facility] 25 ANA 25, [facility]   SVE as admission /-3> unchanged at recheck 2 hrs after admission    Plan:   Growth Scan   Delivery plan : repeat  section delivery at 34wks ( see PPROM prob list) pending  scheduling for 2025  NICU consulted and made aware at admission   Continuous fetal monitoring, consider NST TID is CAT I is sustained and no concern for progression into labor    Gonorrhea  Assessment & Plan  Hx of gonorrhea tx 2024  Negative HANK on 24  GC collected on admission                Chief Complaint   Patient presents with    Rupture of Membranes       Physician Requesting Consult: Nancy Solo MD  Reason for Consult / Principal Problem:  premature rupture of membranes   Subspeciality: Perinatology    Dear Dr. Kowalski ,    Thank you for referring patient Yvette Siddiqui for Maternal-Fetal Medicine consultation regarding premature  vaginal delivery &  contractions.  HPI: As you know, Ms. Siddiqui is a 39 y.o. year-old   with an ANA of Estimated Date of Delivery: 4/10/25 at 33w4d, Hospital Day: 2, admitted for PPROM.  Her current pregnancy is significant for prior , desire for tubal sterilization.  Her obstetrical history is significant for three prior c-sections    Other obstetric review of symptoms:  Contractions: every 3 minutes.    Leakage of fluid: onset: 2025 at 2130  .    Bleeding: None.    Fetal movement: present.    Genitourinary:positive for vaginal discharge  Review of Systems   Constitutional:  Negative for chills and fever.   Eyes:  Negative for photophobia and visual disturbance.   Respiratory:  Negative for cough.    Cardiovascular:  Negative for chest pain and leg swelling.   Gastrointestinal:  Positive for abdominal pain (contraction). Negative for diarrhea, nausea and vomiting.   Genitourinary:  Positive for vaginal discharge (leakage of fluid). Negative for dysuria and vaginal bleeding.   Neurological:  Negative for dizziness and headaches.       Other history is as follows:    Historical Information   OB History    Para Term  AB Living   5 3 3  1 3   SAB IAB Ectopic Multiple Live Births   1    3      # Outcome Date GA Lbr Edmundo/2nd Weight Sex Type Anes PTL Lv   5 Current            4 Term 19 39w0d  3175 g (7 lb) F CS-LTranv Spinal N PRISCILLA      Birth Comments: RCS   3 SAB  8w0d             Birth Comments: naturally- procedure   2 Term 08 39w0d  3175 g (7 lb) F CS-Unspec Spinal Y PRISCILLA      Birth Comments: RCS   1 Term 07 40w0d  3515 g (7 lb 12 oz) M CS-LTranv Spinal N PRISCILLA      Birth Comments: C/s for fetal intolerence      Complications: Fetal Intolerance     Gynecologic history: Patient's last menstrual period was 2024 (exact date).     History reviewed. No pertinent past medical history.  Past Surgical History:   Procedure Laterality Date    APPENDECTOMY       SECTION      GALLBLADDER SURGERY      KNEE SURGERY Right      Social History   Social History      Substance and Sexual Activity   Alcohol Use Not Currently     Social History     Substance and Sexual Activity   Drug Use Never     Social History     Tobacco Use   Smoking Status Never   Smokeless Tobacco Never     Family History: Family history non-contributory    Meds/Allergies   None     Medication Administration - last 24 hours from 02/23/2025 0840 to 02/24/2025 0840         Date/Time Order Dose Route Action Action by     02/24/2025 0101 EST betamethasone acetate-betamethasone sodium phosphate (CELESTONE) injection 12 mg 12 mg Intramuscular Given Angelika Dawson RN     02/24/2025 0102 EST sodium chloride 0.9 % infusion 125 mL/hr Intravenous New Bag Angelika Dawson RN     02/24/2025 0102 EST azithromycin (ZITHROMAX) 500 mg in sodium chloride 0.9% 250mL IVPB 500 mg 500 mg Intravenous New Bag Angelika Dawson RN     02/24/2025 0319 EST ampicillin (OMNIPEN) 2,000 mg in sodium chloride 0.9 % 100 mL IVPB -- Intravenous Canceled Entry Susan Woodard RN     02/24/2025 0131 EST NIFEdipine (PROCARDIA) capsule 30 mg 30 mg Oral Given Susan Woodard RN     02/24/2025 0639 EST NIFEdipine (PROCARDIA) capsule 10 mg 10 mg Oral Not Given Fatuma Paniagua RN     02/24/2025 0100 EST sodium chloride 0.9 % bolus 1,000 mL -- Intravenous Canceled Entry Susan Woodard RN     02/24/2025 0020 EST sodium chloride 0.9 % bolus 1,000 mL -- Intravenous Canceled Entry Susan Woodard RN     02/24/2025 0345 EST fluconazole (DIFLUCAN) tablet 200 mg 200 mg Oral Given Susan Woodard RN     02/24/2025 0725 EST metroNIDAZOLE (FLAGYL) IVPB (premix) 500 mg 100 mL 0 mg Intravenous Stopped Fatuma Paniagua RN     02/24/2025 0644 EST metroNIDAZOLE (FLAGYL) IVPB (premix) 500 mg 100 mL 500 mg Intravenous New Bag Ftauma Paniagua RN     02/24/2025 0630 EST azithromycin (ZITHROMAX) 500 mg in sodium chloride 0.9% 250mL IVPB 500 mg 0 mg Intravenous Stopped Fatuma Paniagua RN     02/24/2025 0502 EST azithromycin (ZITHROMAX) 500 mg in sodium  "chloride 0.9% 250mL IVPB 500 mg 500 mg Intravenous New Bag Fatuma Paniagua, BARBIE     02/24/2025 0320 EST ampicillin (OMNIPEN) 2,000 mg in sodium chloride 0.9 % 100 mL IVPB 2,000 mg Intravenous New Bag Susan Woodard, BARBIE            Current Facility-Administered Medications:     ampicillin (OMNIPEN) 2,000 mg in sodium chloride 0.9 % 100 mL IVPB, Q6H, Last Rate: 2,000 mg (02/24/25 0320) **FOLLOWED BY** [START ON 2/25/2025] amoxicillin (AMOXIL) capsule 500 mg, Q8H LACEY    betamethasone acetate-betamethasone sodium phosphate (CELESTONE) injection 12 mg, Q24H    bupivacaine (PF) (MARCAINE) 0.25 % injection 30 mL, Once PRN    metroNIDAZOLE (FLAGYL) IVPB (premix) 500 mg 100 mL, BID, Last Rate: Stopped (02/24/25 0725)    [COMPLETED] NIFEdipine (PROCARDIA) capsule 30 mg, Once **FOLLOWED BY** NIFEdipine (PROCARDIA) capsule 10 mg, Q6H    sodium chloride 0.9 % bolus 1,000 mL, Once    sodium chloride 0.9 % infusion, Continuous, Last Rate: 125 mL/hr (02/24/25 0102)  No Known Allergies    Objective    Patient Vitals for the past 24 hrs:   BP Temp Temp src Pulse Resp SpO2 Height Weight   02/24/25 0818 95/54 98.3 °F (36.8 °C) Oral 91 18 95 % -- --   02/24/25 0639 100/57 -- -- -- -- -- -- --   02/24/25 0423 100/59 98.2 °F (36.8 °C) Oral 98 18 97 % -- --   02/24/25 0309 100/57 98.4 °F (36.9 °C) Oral 95 18 96 % -- --   02/24/25 0015 111/59 98.4 °F (36.9 °C) Oral 92 16 -- 5' 3\" (1.6 m) 76.7 kg (169 lb)   02/23/25 2310 126/59 -- -- (!) 109 18 96 % -- --     Vitals: Blood pressure 95/54, pulse 91, temperature 98.3 °F (36.8 °C), temperature source Oral, resp. rate 18, height 5' 3\" (1.6 m), weight 76.7 kg (169 lb), last menstrual period 07/04/2024, SpO2 95%.Body mass index is 29.94 kg/m².    Physical Exam  Constitutional:       Appearance: Normal appearance.   HENT:      Head: Normocephalic and atraumatic.   Eyes:      Extraocular Movements: Extraocular movements intact.   Cardiovascular:      Pulses: Normal pulses.   Pulmonary:      Effort: " "Pulmonary effort is normal.   Abdominal:      Tenderness: There is no abdominal tenderness. There is no guarding or rebound.      Comments: gravid   Genitourinary:     Comments: Grossly ruptured with pooling ,  nitrazine positive, negative ferning   Musculoskeletal:         General: Normal range of motion.      Cervical back: Normal range of motion.   Skin:     General: Skin is warm and dry.   Neurological:      General: No focal deficit present.      Mental Status: She is alert.   Psychiatric:         Mood and Affect: Mood normal.         Behavior: Behavior normal.       Dilation: 1cm, Effacement:  50%, and Station:  -3    Prenatal Labs: I have personally reviewed pertinent reports.      Results from last 7 days   Lab Units 02/24/25  0021   WBC Thousand/uL 9.42   TOTAL NEUT ABS Thousands/µL 6.30   HEMOGLOBIN g/dL 10.7*   MCV fL 85   PLATELETS Thousands/uL 459*     Results from last 7 days   Lab Units 02/24/25  0021   SEGS PCT % 68   MONO PCT % 7   EOS PCT % 2           Invalid input(s): \"GLU\", \"CA\"      Results from last 7 days   Lab Units 02/24/25  0021   PLATELETS Thousands/uL 459*                           Fetal data:  Nonstress test: date 02/24/25 Time: 0724 - 0740  Baseline: 125bpm  Variability: moderate  Accelerations: present, 10x10  Decelerations: intermittent variable decelerations  Contractions: present, Q 4  Assessment: reactive  Plan: continuous    [facility] ultrasound report key findings: Recent transfer from [facility] ,   MFM scan was from [facility] at 20wks    Cruz IUP   20 weeks and 2 days by this ultrasound. (ANA=APR 19 2025)   Cephalic presentation   Normal anatomy survey   Regular fetal heart rate of 141 bpm   Anterior placenta   No placenta previa       Imaging, EKG, Pathology, and Other Studies: Results Review Statement: No pertinent imaging studies reviewed.          Delfina Lucio MD  2/24/2025  8:40 AM         "

## 2025-02-24 NOTE — PLAN OF CARE
Problem: PAIN - ADULT  Goal: Verbalizes/displays adequate comfort level or baseline comfort level  Description: Interventions:  - Encourage patient to monitor pain and request assistance  - Assess pain using appropriate pain scale  - Administer analgesics based on type and severity of pain and evaluate response  - Implement non-pharmacological measures as appropriate and evaluate response  - Consider cultural and social influences on pain and pain management  - Notify physician/advanced practitioner if interventions unsuccessful or patient reports new pain  Outcome: Progressing     Problem: INFECTION - ADULT  Goal: Absence or prevention of progression during hospitalization  Description: INTERVENTIONS:  - Assess and monitor for signs and symptoms of infection  - Monitor lab/diagnostic results  - Monitor all insertion sites, i.e. indwelling lines, tubes, and drains  - Monitor endotracheal if appropriate and nasal secretions for changes in amount and color  - [place] appropriate cooling/warming therapies per order  - Administer medications as ordered  - Instruct and encourage patient and family to use good hand hygiene technique  - Identify and instruct in appropriate isolation precautions for identified infection/condition  Outcome: Progressing  Goal: Absence of fever/infection during neutropenic period  Description: INTERVENTIONS:  - Monitor WBC    Outcome: Progressing     Problem: SAFETY ADULT  Goal: Patient will remain free of falls  Description: INTERVENTIONS:  - Educate patient/family on patient safety including physical limitations  - Instruct patient to call for assistance with activity   - Consult OT/PT to assist with strengthening/mobility   - Keep Call bell within reach  - Keep bed low and locked with side rails adjusted as appropriate  - Keep care items and personal belongings within reach  - Initiate and maintain comfort rounds  - Make Fall Risk Sign visible to staff  - Offer Toileting every  PRN  Problem: DISCHARGE PLANNING  Goal: Discharge to home or other facility with appropriate resources  Description: INTERVENTIONS:  - Identify barriers to discharge w/patient and caregiver  - Arrange for needed discharge resources and transportation as appropriate  - Identify discharge learning needs (meds, wound care, etc.)  - Arrange for interpretive services to assist at discharge as needed  - Refer to Case Management Department for coordinating discharge planning if the patient needs post-hospital services based on physician/advanced practitioner order or complex needs related to functional status, cognitive ability, or social support system  Outcome: Progressing     Problem: Knowledge Deficit  Goal: Patient/family/caregiver demonstrates understanding of disease process, treatment plan, medications, and discharge instructions  Description: Complete learning assessment and assess knowledge base.  Interventions:  - Provide teaching at level of understanding  - Provide teaching via preferred learning methods  Outcome: Progressing     Problem: ANTEPARTUM  Goal: Maintain pregnancy as long as maternal and/or fetal condition is stable  Description: INTERVENTIONS:  - Maternal surveillance  - Fetal surveillance  - Monitor uterine activity  - Medications as ordered  - Bedrest  Outcome: Progressing    Hours, in advance of need  - Apply yellow socks and bracelet for high fall risk patients  - Consider moving patient to room near nurses station  Outcome: Progressing  Goal: Maintain or return to baseline ADL function  Description: INTERVENTIONS:  -  Assess patient's ability to carry out ADLs; assess patient's baseline for ADL function and identify physical deficits which impact ability to perform ADLs (bathing, care of mouth/teeth, toileting, grooming, dressing, etc.)  - Assess/evaluate cause of self-care deficits   - Assess range of motion  - Assess patient's mobility; develop plan if impaired  - Assess patient's need for  assistive devices and provide as appropriate  - Encourage maximum independence but intervene and supervise when necessary  - Involve family in performance of ADLs  - Assess for home care needs following discharge   - Consider OT consult to assist with ADL evaluation and planning for discharge  - Provide patient education as appropriate  Outcome: Progressing  Goal: Maintains/Returns to pre admission functional level  Description: INTERVENTIONS:  - Perform AM-PAC 6 Click Basic Mobility/ Daily Activity assessment daily.  - Set and communicate daily mobility goal to care team and patient/family/caregiver.   - Collaborate with rehabilitation services on mobility goals if consulted  - Perform Range of Motion PRN times a day.  - Reposition patient every PRN hours.  - Dangle patient PRN times a day  - Stand patient PRN times a day  - Ambulate patient PRN times a day  - Out of bed to chair PRN times a day   - Out of bed for meals PRN times a day  - Out of bed for toileting  - Record patient progress and toleration of activity level   Outcome: Progressing

## 2025-02-24 NOTE — H&P
H & P- Obstetrics   Yvette Siddiqui 39 y.o. female MRN: [id number]  Unit/Bed#: LD TRIAGE  Encounter: [id number]    Assessment: 39 y.o.  at 33w4d admitted for  premature rupture of membranes and rule out  labor workup   SVE: /-3  FHT: reactive   GBS status: unknown   Postpartum contraception plan: desires permanent sterilization via bilateral salpingectomy     Plan:   *  premature rupture of membranes (PPROM)  Assessment & Plan  Presented to triage at 33w3d w/  complaint of leakage of fluid and contractions  Pelvic exam : grossly ruptured with positive pooling, nitrazine positive, negative ferning  Wet mt/microscopy: positive for clue cells, budding yeast  Otherwise hemodynamically stable with no signs of chorioamnionitis , fundal tenderness, or fever  No signs of abruption       Plan:   - Latency antibiotics x 7 days: 1g Azithromycin PO x 1 + 2g Ampicillin Q6h IV x 48 hours followed by Amoxicillin 500mg PO TID for 5 days  - Betamethasone for fetal lung maturation: 12mg q24h x 2 doses  - Infectious workup: GBS to be collected, Urine culture collected and pending, GC negative on   - Delivery at 34 weeks, sooner if clinically indicated       Early gestational age at membrane rupture and low residual amniotic fluid volume are the primary determinants of the incidence of pulmonary hypoplasia  We discussed that oligohydramnios also can result in fetal deformations, including Potter-like facies (eg, low-set ears and epicanthal folds) and limb contractures or other positioning abnormalities  If she is indeed PPROM'd, 40-50% of patients with periviable PROM will give birth within the first week and approximately 70-80% will give birth within 2-5 weeks after membrane rupture            Vaginitis  Assessment & Plan  Hx ofrecurrent bacterial vaginosis   Clue cells on wet mt on admission  as well as budding yeast      Plan:   Bacterial vaginosis: Metronidazole 500mg BID X 7 days    Yeast infection: 1 dose Diflucan 200mg      History of  delivery  Assessment & Plan  Desires RTLCS    Encounter for consultation for female sterilization  Assessment & Plan  Desires permanent sterilization at time of RTLCS,       Plan:   Sign MA 31    33 weeks gestation of pregnancy  Assessment & Plan  All prenatal labs completed and normal at [facility], 28 week labs were done at 25 weeks NML   20 week Anatomy scan completed at [facility] 25 ANA 25, [facility]   SVE as admission /-3> unchanged at recheck 2 hrs after admission    Plan:   MFM consult for growth scan   Delivery plan : repeat  section delivery at 34wks ( see PPROM prob list)   NICU consulted and made aware at admission   Continuous fetal monitoring overnight     Gonorrhea  Assessment & Plan  Hx of gonorrhea tx 2024  Negative HANK on 24          Discussed case and plan w/ Dr. Solo       Chief Complaint:     HPI: Yvette Siddiqui is a 39 y.o.  with an ANA of 4/10/2025, by Last Menstrual Period at 33w4d who is being admitted for  premature rupture of membranes and rule out  labor work up. She complains of uterine contractions, occurring every 3 minutes, has moderate LOF, and reports no VB. She states she has felt good FM..    Patient Active Problem List   Diagnosis    Antepartum multigravida of advanced maternal age    Gonorrhea    33 weeks gestation of pregnancy    Encounter for consultation for female sterilization    Encounter for screening for maternal depression    History of  delivery     premature rupture of membranes (PPROM)    Vaginitis       Baby complications/comments: none    Review of Systems   Constitutional:  Negative for chills and fever.   HENT:  Negative for ear pain and sore throat.    Eyes:  Negative for pain and visual disturbance.   Respiratory:  Negative for cough and shortness of breath.    Cardiovascular:  Negative for chest pain and palpitations.   Gastrointestinal:   Positive for abdominal pain (contractions). Negative for vomiting.   Genitourinary:  Positive for vaginal discharge. Negative for dysuria, hematuria and vaginal bleeding.   Musculoskeletal:  Negative for arthralgias and back pain.   Skin:  Negative for color change and rash.   Neurological:  Negative for seizures and syncope.   All other systems reviewed and are negative.      OB Hx:  OB History    Para Term  AB Living   5 3 3  1 3   SAB IAB Ectopic Multiple Live Births   1    3      # Outcome Date GA Lbr Edmundo/2nd Weight Sex Type Anes PTL Lv   5 Current            4 Term 19 39w0d  3175 g (7 lb) F CS-LTranv Spinal N PRISCILLA      Birth Comments: RCS   3 SAB  8w0d             Birth Comments: naturally- procedure   2 Term 08 39w0d  3175 g (7 lb) F CS-Unspec Spinal Y PRISCILLA      Birth Comments: RCS   1 Term 07 40w0d  3515 g (7 lb 12 oz) M CS-LTranv Spinal N PRISCILLA      Birth Comments: C/s for fetal intolerence      Complications: Fetal Intolerance       Past Medical Hx:  No past medical history on file.    Past Surgical hx:  Past Surgical History:   Procedure Laterality Date    APPENDECTOMY       SECTION      GALLBLADDER SURGERY      KNEE SURGERY Right        Social Hx:  Alcohol use: denies  Tobacco use: denies  Other substance use: denies    Other: NA    No Known Allergies    No medications prior to admission.    Objective:  Temp:  [98.4 °F (36.9 °C)] 98.4 °F (36.9 °C)  HR:  [] 92  BP: (111-126)/(59) 111/59  Resp:  [16-18] 16  SpO2:  [96 %] 96 %  Body mass index is 29.94 kg/m².     Physical Exam:  Chaperone present   Physical Exam  Constitutional:       Appearance: Normal appearance.   Genitourinary:      Vulva normal.   HENT:      Head: Normocephalic and atraumatic.   Eyes:      Extraocular Movements: Extraocular movements intact.   Cardiovascular:      Rate and Rhythm: Normal rate and regular rhythm.      Heart sounds: Normal heart sounds. No murmur heard.     No friction rub. No  gallop.   Pulmonary:      Effort: Pulmonary effort is normal. No respiratory distress.      Breath sounds: No wheezing or rhonchi.   Abdominal:      General: There is no distension.      Palpations: Abdomen is soft.      Tenderness: There is no abdominal tenderness. There is no guarding.      Comments: gravid   Musculoskeletal:         General: No swelling or tenderness. Normal range of motion.      Cervical back: Normal range of motion.   Neurological:      Mental Status: She is alert. Mental status is at baseline.   Skin:     General: Skin is warm.      Findings: No rash.   Psychiatric:         Mood and Affect: Mood normal.            FHT:   Baseline 145bpm, Moderate variability 6-25bpm, 10x10 accelerations, no decelerations    TOCO:    Contractions every 3 minutes at admssion     Lab Results   Component Value Date    WBC 9.42 02/24/2025    HGB 10.7 (L) 02/24/2025    HCT 33.1 (L) 02/24/2025     (H) 02/24/2025     Lab Results   Component Value Date    K 3.7 12/27/2024     12/27/2024    CO2 25 12/27/2024    BUN 8 12/27/2024    CREATININE 0.51 12/27/2024    AST 17 12/27/2024    ALT 21 12/27/2024     Prenatal Labs: Reviewed      Blood type: O positive   Antibody: negative   GBS: unknown   HIV: nonreactive   Rubella: Immune  VDRL/RPR: Non reactive  HBsAg: Negative  Chlamydia: Negative  Gonorrhea: Negative  Diabetes 1 hour screen: 113  3 hour glucose: NA  Platelets: 394  Hgb: 11  >2 Midnights  INPATIENT     Signature/Title: Delfina Lucio MD  Date: 2/24/2025  Time: 3:03 AM

## 2025-02-24 NOTE — ASSESSMENT & PLAN NOTE
All prenatal labs completed and normal at [facility], 28 week labs were done at 25 weeks NML   Growth scan 25 13%, vertex   SVE as admission 3> unchanged at recheck 2 hrs after admission    Plan:   Delivery plan : repeat  section delivery at 34wks ( see PPROM prob list) pending  scheduling for 2025 at 0800  NICU consulted and made aware at admission   Continuous fetal monitoring, consider NST TID is CAT I is sustained and no concern for progression into labor

## 2025-02-24 NOTE — ASSESSMENT & PLAN NOTE
Presented to triage at 33w3d w/  complaint of leakage of fluid and contractions  Pelvic exam : grossly ruptured with positive pooling, nitrazine positive, negative ferning  Wet mt/microscopy: positive for clue cells, budding yeast  Otherwise hemodynamically stable with no signs of chorioamnionitis , fundal tenderness, or fever  No signs of abruption       Plan:   - Latency antibiotics x 7 days: 1g Azithromycin PO x 1 + 2g Ampicillin Q6h IV x 48 hours followed by Amoxicillin 500mg PO TID for 5 days  - Betamethasone for fetal lung maturation: 12mg q24h x 2 doses  - Infectious workup: GBS to be collected, Urine culture collected and pending, GC negative on 2/20  - Delivery at 34 weeks, sooner if clinically indicated       Early gestational age at membrane rupture and low residual amniotic fluid volume are the primary determinants of the incidence of pulmonary hypoplasia  We discussed that oligohydramnios also can result in fetal deformations, including Potter-like facies (eg, low-set ears and epicanthal folds) and limb contractures or other positioning abnormalities  If she is indeed PPROM'd, 40-50% of patients with periviable PROM will give birth within the first week and approximately 70-80% will give birth within 2-5 weeks after membrane rupture

## 2025-02-25 LAB
BACTERIA UR CULT: NORMAL
GP B STREP DNA SPEC QL NAA+PROBE: NEGATIVE

## 2025-02-25 PROCEDURE — 59025 FETAL NON-STRESS TEST: CPT | Performed by: STUDENT IN AN ORGANIZED HEALTH CARE EDUCATION/TRAINING PROGRAM

## 2025-02-25 PROCEDURE — 99232 SBSQ HOSP IP/OBS MODERATE 35: CPT | Performed by: STUDENT IN AN ORGANIZED HEALTH CARE EDUCATION/TRAINING PROGRAM

## 2025-02-25 RX ORDER — ACETAMINOPHEN 325 MG/1
975 TABLET ORAL EVERY 6 HOURS PRN
Status: DISCONTINUED | OUTPATIENT
Start: 2025-02-25 | End: 2025-02-27 | Stop reason: SDUPTHER

## 2025-02-25 RX ADMIN — AMOXICILLIN 500 MG: 250 CAPSULE ORAL at 22:49

## 2025-02-25 RX ADMIN — AMPICILLIN SODIUM 2000 MG: 2 INJECTION, POWDER, FOR SOLUTION INTRAMUSCULAR; INTRAVENOUS at 11:00

## 2025-02-25 RX ADMIN — METRONIDAZOLE 500 MG: 500 TABLET ORAL at 22:49

## 2025-02-25 RX ADMIN — AMPICILLIN SODIUM 2000 MG: 2 INJECTION, POWDER, FOR SOLUTION INTRAMUSCULAR; INTRAVENOUS at 17:30

## 2025-02-25 RX ADMIN — METRONIDAZOLE 500 MG: 500 TABLET ORAL at 08:11

## 2025-02-25 RX ADMIN — ACETAMINOPHEN 975 MG: 325 TABLET, FILM COATED ORAL at 00:40

## 2025-02-25 RX ADMIN — AMPICILLIN SODIUM 2000 MG: 2 INJECTION, POWDER, FOR SOLUTION INTRAMUSCULAR; INTRAVENOUS at 04:03

## 2025-02-25 RX ADMIN — BETAMETHASONE SODIUM PHOSPHATE AND BETAMETHASONE ACETATE 12 MG: 3; 3 INJECTION, SUSPENSION INTRA-ARTICULAR; INTRALESIONAL; INTRAMUSCULAR at 00:42

## 2025-02-25 NOTE — PROGRESS NOTES
Progress Note - M  Name: Yvette Siddiqui 39 y.o. female I MRN: [id number]  Unit/Bed#: -01 I Date of Admission: 2025   Date of Service: 2025 I Hospital Day: 1     Assessment & Plan   premature rupture of membranes (PPROM)  Presented to triage at 33w3d w/  complaint of leakage of fluid and contractions  Pelvic exam : grossly ruptured with positive pooling, nitrazine positive, negative ferning  Wet mt/microscopy: positive for clue cells, budding yeast  Otherwise hemodynamically stable with no signs of chorioamnionitis , fundal tenderness, or fever  No signs of abruption   S/p loading dose of Procardia, tocolysis held thereafter  Speculum exam on  not concerning for infection or labor    Plan:   - Latency antibiotics x 7 days: 1g Azithromycin x 1 + 2g Ampicillin Q6h IV x 48 hours followed by Amoxicillin 500mg PO TID for 5 days  - Betamethasone for fetal lung maturation: -  - Infectious workup: GBS  collected, Urine culture collected and pending, GC negative on   - Delivery at 34 weeks, sooner if clinically indicated   Antepartum multigravida of advanced maternal age    Gonorrhea  Hx of gonorrhea tx 2024  Negative HANK on 24  GC collected on admission       33 weeks gestation of pregnancy  [facility], 28 week labs were done at 25 weeks NML   Growth scan 25 13%, vertex   SVE as admission /-3> unchanged at recheck 2 hrs after admission    Plan:   Delivery plan : repeat  section delivery at 34wks ( see PPROM prob list) pending  scheduling for 2025 at 0800  NICU consulted and made aware at admission   Continuous fetal monitoring, consider NST TID is CAT I is sustained and no concern for progression into labor  Encounter for consultation for female sterilization  Desires permanent sterilization at time of RTLCS,       Plan:   Sign consent  History of  delivery   X 3  Desires RTLCS, scheduled for  25 at 0800, or earlier if indicated    Vaginitis  Hx of recurrent bacterial vaginosis   Clue cells on wet mt on admission  as well as budding yeast      Plan:   Bacterial vaginosis: Metronidazole 500mg BID X 7 days   Yeast infection: 1 dose Diflucan 200mg     contractions  SVE /-3 and unchanged at 2 hr recheck on admission  S/P procardia 30mg bolus, discontinued thereafter  [place]: contraction Q 3-4     Plan:   Reg diet  See plan under PPROM    OB L&D Progress Note  Subjective   Patient states that the leakage of fluid has an odor that is new from overnight. Denies bleeding, and also feels some cramping. Feels fetal movement.    Objective :  Temp:  [97.4 °F (36.3 °C)-98.3 °F (36.8 °C)] 97.8 °F (36.6 °C)  HR:  [] 89  BP: ()/(51-64) 90/64  Resp:  [18-20] 18  SpO2:  [95 %-97 %] 97 %  O2 Device: None (Room air)    Physical Exam  GEN: The patient was alert and oriented x3, pleasant well-appearing female in no acute distress.   CV: Regular rate  PULM: nonlabored respirations  MSK: Normal gait  Skin: warm, dry  Neuro: no focal deficits  Psych: normal affect and judgement, cooperative  Speculum: clear vaginal fluid without odor, cervix appears closed    FHR continuous: 130 bpm, moderate variability, 15x15 accelerations, no decelerations    Silvana Castle  OBGYN PGY3

## 2025-02-25 NOTE — DISCHARGE SUMMARY
Discharge Summary - OB/GYN   Name: Yvette Siddiqui 39 y.o. female I MRN: [id number]  Unit/Bed#: -01 I Date of Admission: 2025   Date of Service: 3/2/2025 I Hospital Day: 6    ADMISSION  Patient of: [facility]  Admission Date: 2025   Admitting Attending: Dr. Nancy Solo MD  Admitting Diagnoses:   33 weeks gestation of pregnancy  PPROM  History of gonorrhea  Anxiety/Depression  Hypothryoidism    DELIVERY  Delivery Method: , Low Transverse   Delivery Date and Time: 2025 9:06 AM  Delivery Attending: Dr. Solo    DISCHARGE  Discharge Date: 3/2/25  Discharge Attending: Dr. Rosen  Discharge Diagnosis:   Same, Delivered    Clinical course: Admission to Delivery  Yvette Siddiqui is a 39 y.o.  who was admitted at 33w4d for PPROM. She was started on 7 day course of latency antibiotics, received BTM x2, and GBS was collected. Patient was scheduled for delivery via RLTCS at 34w0d with bilateral salpingectomy. She was found to have yeast and bacterial vaginosis, and was treated with single dose of fluconazole and flagyl 500mg BID x 7 days. She had a growth scan done on admission with EFW 13%. NICU was consulted. Fetal monitoring was continuous, and changed to NST TID for patient stability.      Delivery  Route of Delivery: , Low Transverse  Reason for  delivery: Prior       Anesthesia: Spinal;Epidural,   QBL:          Delivery: , Low Transverse at 2025 9:06 AM    Baby's Weight: 1925 g (4 lb 3.9 oz); 67.9    Apgar scores: 8  and 9  at 1 and 5 minutes, respectively    Clinical Course: Post-Delivery:  The post delivery course was remarkable.    On the day of discharge, the patient was ambulating, voiding spontaneously, tolerating oral intake, and hemodynamically stable. She was able to reasonably perform all ADLs. She had appropriate bowel function. Pain was well-controlled. She was discharged home on postpartum/postop day  #3 without complications. Patient was instructed to follow up with her OB as an outpatient and was given appropriate warnings to call her provider with problems or concerns.    Pertinent lab findings included:   Blood type O+.     Last three Hgb values:  Lab Results   Component Value Date    HGB 8.7 (L) 02/28/2025    HGB 8.3 (L) 02/28/2025    HGB 10.9 (L) 02/27/2025        Discharge med list:     Medication List      START taking these medications     acetaminophen 325 mg tablet; Commonly known as: TYLENOL; Take 3 tablets   (975 mg total) by mouth every 6 (six) hours for 1 dose   ibuprofen 600 mg tablet; Commonly known as: MOTRIN; Take 1 tablet (600   mg total) by mouth every 6 (six) hours   metroNIDAZOLE 500 mg tablet; Commonly known as: FLAGYL; Take 1 tablet   (500 mg total) by mouth every 12 (twelve) hours for 2 doses   witch hazel-glycerin topical pad; Commonly known as: TUCKS; Apply 1 Pad   topically every 4 (four) hours as needed for irritation       Condition at discharge:   good     Disposition:   Home    Planned Readmission:   No

## 2025-02-25 NOTE — UTILIZATION REVIEW
Initial Clinical Review    Admission: Date/Time/Statement:   Admission Orders (From admission, onward)       Ordered        25 0025  Inpatient Admission  Once                          Orders Placed This Encounter   Procedures    Inpatient Admission     Standing Status:   Standing     Number of Occurrences:   1     Level of Care:   Med Surg [16]     Estimated length of stay:   More than 2 Midnights     Certification:   I certify that inpatient services are medically necessary for this patient for a duration of greater than two midnights. See H&P and MD Progress Notes for additional information about the patient's course of treatment.     ED Arrival Information       Expected   2025     Arrival   2025 23:24    Acuity   -              Means of arrival   Walk-In    Escorted by   Family Member    Service   OB/GYN    Admission type   Emergency              Arrival complaint   33 weeks pre-contraction; water broke             Chief Complaint   Patient presents with    Rupture of Membranes       Initial Presentation: 39 y.o. female presented to ED as inpatient admission for PPROM @ 33 w 4 d G 5 P 3 Patient complaints of leakage of fluid and contractions Pelvic exam : grossly ruptured with positive pooling, nitrazine positive, negative ferning Wet mt/microscopy: positive for clue cells, budding yeast low residual amniotic fluid volume.  Passt OB hx  x 3  Plan patient will remain in house until 34 weeks at which time a  will be preformed. Plan BTM X 2 doses Latency antibiotics x 7 days: 1g Azithromycin PO x 1 + 2g Ampicillin Q6h IV x 48 hours followed by Amoxicillin 500mg PO TID for 5 days bacterial vaginosis Metronidazole 500mg BID X 7 days Yeast infection: 1 dose Diflucan 200mg  continuous external fetal monitoring consult neonatology and anesthesiology and supportive care.  FHT:   Baseline 145bpm, Moderate variability 6-25bpm, 10x10 accelerations, no decelerations     TOCO:    Contractions  every 3 minutes at admssion   Anticipated Length of Stay/Certification Statement: >2 Midnights  INPATIENT     Date: 25   Day 2  33 w 5 d Delivery plan : repeat  section delivery at 34wks ( see PPROM prob list) pending  scheduling for 2025 at 0800   SVE 1/50/-3 and unchanged at 2 hr recheck on admission.Patient states that the leakage of fluid has an odor that is new from overnight. Denies bleeding, and also feels some cramping. Feels fetal movement. S/P procardia 30mg bolus, discontinued thereafter [place]: contraction Q 3-4 Speculum: clear vaginal fluid without odor, cervix appears closed    FHR continuous: 130 bpm, moderate variability, 15x15 accelerations, no decelerations     ED Treatment-Medication Administration from 2025 2309 to 2025 1029         Date/Time Order Dose Route Action     2025 0101 betamethasone acetate-betamethasone sodium phosphate (CELESTONE) injection 12 mg 12 mg Intramuscular Given     2025 0042 betamethasone acetate-betamethasone sodium phosphate (CELESTONE) injection 12 mg 12 mg Intramuscular Given     2025 0102 sodium chloride 0.9 % infusion 125 mL/hr Intravenous New Bag     2025 0922 sodium chloride 0.9 % infusion 999 mL/hr Intravenous Rate/Dose Change     2025 1006 sodium chloride 0.9 % infusion 125 mL/hr Intravenous New Bag     2025 0102 azithromycin (ZITHROMAX) 500 mg in sodium chloride 0.9% 250mL IVPB 500 mg 500 mg Intravenous New Bag     2025 0131 NIFEdipine (PROCARDIA) capsule 30 mg 30 mg Oral Given     2025 0345 fluconazole (DIFLUCAN) tablet 200 mg 200 mg Oral Given     2025 0644 metroNIDAZOLE (FLAGYL) IVPB (premix) 500 mg 100 mL 500 mg Intravenous New Bag     2025 1806 metroNIDAZOLE (FLAGYL) IVPB (premix) 500 mg 100 mL 500 mg Intravenous New Bag     2025 0502 azithromycin (ZITHROMAX) 500 mg in sodium chloride 0.9% 250mL IVPB 500 mg 500 mg Intravenous New Bag     2025 0320  ampicillin (OMNIPEN) 2,000 mg in sodium chloride 0.9 % 100 mL IVPB 2,000 mg Intravenous New Bag     02/24/2025 1007 ampicillin (OMNIPEN) 2,000 mg in sodium chloride 0.9 % 100 mL IVPB 2,000 mg Intravenous New Bag     02/24/2025 1609 ampicillin (OMNIPEN) 2,000 mg in sodium chloride 0.9 % 100 mL IVPB 2,000 mg Intravenous New Bag     02/24/2025 2235 ampicillin (OMNIPEN) 2,000 mg in sodium chloride 0.9 % 100 mL IVPB 2,000 mg Intravenous New Bag     02/25/2025 0403 ampicillin (OMNIPEN) 2,000 mg in sodium chloride 0.9 % 100 mL IVPB 2,000 mg Intravenous New Bag     02/25/2025 0811 metroNIDAZOLE (FLAGYL) tablet 500 mg 500 mg Oral Given     02/25/2025 0040 acetaminophen (TYLENOL) tablet 975 mg 975 mg Oral Given            Scheduled Medications:  ampicillin, 2,000 mg, Intravenous, Q6H   Followed by  amoxicillin, 500 mg, Oral, Q8H LACEY  metroNIDAZOLE, 500 mg, Oral, Q12H LACEY  NIFEdipine, 10 mg, Oral, Q6H  sodium chloride, 1,000 mL, Intravenous, Once  betamethasone acetate-betamethasone sodium phosphate  02-24-25 & 02-25-25    Continuous IV Infusions:   sodium chloride 0.9 % infusion @ 125  d/c 02-24-25  PRN Meds:  acetaminophen, 975 mg, Oral, Q6H PRN  bupivacaine (PF), 30 mL, Infiltration, Once PRN      ED Triage Vitals   Temperature Pulse Respirations Blood Pressure SpO2 Pain Score   02/24/25 0015 02/23/25 2310 02/23/25 2310 02/23/25 2310 02/23/25 2310 02/24/25 0015   98.4 °F (36.9 °C) (!) 109 18 126/59 96 % 5     Weight (last 2 days)       Date/Time Weight    02/25/25 0813 --    Comment rows:    OBSERV: pt reports positive fetal movement; denies feeling contractions, abdominal discomfort or tenderness; denies vaginal bleeding. at 02/25/25 0813    02/24/25 1933 --    Comment rows:    OBSERV: pt reports positive fetal movement; denies feeling contractions, abdominal discomfort or tenderness; denies vaginal bleeding. at 02/24/25 1933    02/24/25 1500 --    Comment rows:    OBSERV: pt. denies contractions at this time at 02/24/25  1500    02/24/25 0818 --    Comment rows:    OBSERV: pt. reports positive fetal movement; mild contractions; continues to monitor pad counts; instructed to call for any signs/symptoms of labor at 02/24/25 0818 02/24/25 0015 76.7 (169)            Vital Signs (last 3 days)       Date/Time Temp Pulse Resp BP MAP (mmHg) SpO2 O2 Device Cardiac (WDL) Patient Position - Orthostatic VS Pain    02/25/25 0912 98 °F (36.7 °C) 100 20 98/57 -- 98 % -- -- -- No Pain    02/25/25 0813 -- -- -- -- -- -- None (Room air) WDL -- --    OBSERV: pt reports positive fetal movement; denies feeling contractions, abdominal discomfort or tenderness; denies vaginal bleeding. at 02/25/25 0813 02/25/25 0605 -- -- -- 90/64 -- -- -- -- -- --    02/25/25 0356 97.8 °F (36.6 °C) 89 18 101/60 74 97 % None (Room air) -- Sitting 3    02/25/25 0040 -- -- -- -- -- -- -- -- -- 8    02/25/25 0002 98.2 °F (36.8 °C) 68 18 90/53 65 95 % None (Room air) -- Lying No Pain    02/24/25 2037 97.4 °F (36.3 °C) 96 20 100/51 -- 95 % None (Room air) -- Lying No Pain    02/24/25 1933 -- -- -- -- -- -- None (Room air) WDL -- No Pain    OBSERV: pt reports positive fetal movement; denies feeling contractions, abdominal discomfort or tenderness; denies vaginal bleeding. at 02/24/25 1933 02/24/25 1834 -- 90 -- 109/62 -- -- -- -- -- --    02/24/25 1554 97.7 °F (36.5 °C) 105 20 106/58 -- 95 % None (Room air) -- Lying No Pain    02/24/25 1500 -- -- -- -- -- -- -- -- -- --    OBSERV: pt. denies contractions at this time at 02/24/25 1500    02/24/25 1242 -- 92 -- 100/57 -- -- -- -- -- --    02/24/25 1202 97.6 °F (36.4 °C) 95 18 104/58 -- 96 % None (Room air) -- Sitting 4    02/24/25 0818 98.3 °F (36.8 °C) 91 18 95/54 -- 95 % None (Room air) WDL Sitting 5    OBSERV: pt. reports positive fetal movement; mild contractions; continues to monitor pad counts; instructed to call for any signs/symptoms of labor at 02/24/25 0818    02/24/25 0639 -- -- -- 100/57 -- -- -- -- -- --     02/24/25 0430 -- -- -- -- -- -- -- WDL -- --    02/24/25 0423 98.2 °F (36.8 °C) 98 18 100/59 74 97 % None (Room air) -- Sitting 5    02/24/25 0309 98.4 °F (36.9 °C) 95 18 100/57 -- 96 % -- -- Lying 5    02/24/25 0015 98.4 °F (36.9 °C) 92 16 111/59 -- -- -- -- -- 5    02/23/25 2310 -- 109 18 126/59 -- 96 % -- -- -- --              Pertinent Labs/Diagnostic Test Results:         Results from last 7 days   Lab Units 02/24/25  0021   WBC Thousand/uL 9.42   HEMOGLOBIN g/dL 10.7*   HEMATOCRIT % 33.1*   PLATELETS Thousands/uL 459*   TOTAL NEUT ABS Thousands/µL 6.30     Results from last 7 days   Lab Units 02/20/25  1115   GLUCOSE UA  negative   PROTEIN UA  positive       Results from last 7 days   Lab Units 02/24/25  0340   URINE CULTURE  No Growth <1000 cfu/mL                   History reviewed. No pertinent past medical history.  Present on Admission:   Antepartum multigravida of advanced maternal age   Gonorrhea   Encounter for consultation for female sterilization      Admitting Diagnosis: 33 weeks gestation of pregnancy [Z3A.33]  Pregnancy [Z34.90]  Full-term premature rupture of membranes, unspecified as to length of time between rupture and onset of labor [O42.92]  Age/Sex: 39 y.o. female    Network Utilization Review Department  ATTENTION: Please call with any questions or concerns to [phone] and carefully listen to the prompts so that you are directed to the right person. All voicemails are confidential.   For Discharge needs, contact Care Management DC Support Team at [phone] opt. 2  Send all requests for admission clinical reviews, approved or denied determinations and any other requests to dedicated fax number below belonging to the campus where the patient is receiving treatment. List of dedicated fax numbers for the Facilities:  FACILITY NAME UR FAX NUMBER   ADMISSION DENIALS (Administrative/Medical Necessity) [phone]   DISCHARGE SUPPORT TEAM (NETWORK) [phone]   PARENT CHILD HEALTH  (Maternity/NICU/Pediatrics) [phone]   [facility] [phone]   [facility] [phone]   [facility] [phone]   [facility] [phone]   [facility] [phone]   [facility] [phone]   [facility] [phone]   [facility] [phone]   [facility] [phone]   [facility] [phone]   [facility] [phone]   [facility] [phone]

## 2025-02-25 NOTE — PLAN OF CARE
Problem: PAIN - ADULT  Goal: Verbalizes/displays adequate comfort level or baseline comfort level  Description: Interventions:  - Encourage patient to monitor pain and request assistance  - Assess pain using appropriate pain scale  - Administer analgesics based on type and severity of pain and evaluate response  - Implement non-pharmacological measures as appropriate and evaluate response  - Consider cultural and social influences on pain and pain management  - Notify physician/advanced practitioner if interventions unsuccessful or patient reports new pain  Outcome: Progressing     Problem: INFECTION - ADULT  Goal: Absence or prevention of progression during hospitalization  Description: INTERVENTIONS:  - Assess and monitor for signs and symptoms of infection  - Monitor lab/diagnostic results  - Monitor all insertion sites, i.e. indwelling lines, tubes, and drains  - Monitor endotracheal if appropriate and nasal secretions for changes in amount and color  - Remlap appropriate cooling/warming therapies per order  - Administer medications as ordered  - Instruct and encourage patient and family to use good hand hygiene technique  - Identify and instruct in appropriate isolation precautions for identified infection/condition  Outcome: Progressing  Goal: Absence of fever/infection during neutropenic period  Description: INTERVENTIONS:  - Monitor WBC    Outcome: Progressing     Problem: SAFETY ADULT  Goal: Patient will remain free of falls  Description: INTERVENTIONS:  - Educate patient/family on patient safety including physical limitations  - Instruct patient to call for assistance with activity   - Consult OT/PT to assist with strengthening/mobility   - Keep Call bell within reach  - Keep bed low and locked with side rails adjusted as appropriate  - Keep care items and personal belongings within reach  - Initiate and maintain comfort rounds  - Make Fall Risk Sign visible to staff  - Apply yellow socks and bracelet  for high fall risk patients  - Consider moving patient to room near nurses station  Outcome: Progressing  Goal: Maintain or return to baseline ADL function  Description: INTERVENTIONS:  -  Assess patient's ability to carry out ADLs; assess patient's baseline for ADL function and identify physical deficits which impact ability to perform ADLs (bathing, care of mouth/teeth, toileting, grooming, dressing, etc.)  - Assess/evaluate cause of self-care deficits   - Assess range of motion  - Assess patient's mobility; develop plan if impaired  - Assess patient's need for assistive devices and provide as appropriate  - Encourage maximum independence but intervene and supervise when necessary  - Involve family in performance of ADLs  - Assess for home care needs following discharge   - Consider OT consult to assist with ADL evaluation and planning for discharge  - Provide patient education as appropriate  Outcome: Progressing  Goal: Maintains/Returns to pre admission functional level  Description: INTERVENTIONS:  - Perform AM-PAC 6 Click Basic Mobility/ Daily Activity assessment daily.  - Set and communicate daily mobility goal to care team and patient/family/caregiver.   - Collaborate with rehabilitation services on mobility goals if consulted  - Out of bed for toileting  - Record patient progress and toleration of activity level   Outcome: Progressing     Problem: DISCHARGE PLANNING  Goal: Discharge to home or other facility with appropriate resources  Description: INTERVENTIONS:  - Identify barriers to discharge w/patient and caregiver  - Arrange for needed discharge resources and transportation as appropriate  - Identify discharge learning needs (meds, wound care, etc.)  - Arrange for interpretive services to assist at discharge as needed  - Refer to Case Management Department for coordinating discharge planning if the patient needs post-hospital services based on physician/advanced practitioner order or complex needs  related to functional status, cognitive ability, or social support system  Outcome: Progressing     Problem: Knowledge Deficit  Goal: Patient/family/caregiver demonstrates understanding of disease process, treatment plan, medications, and discharge instructions  Description: Complete learning assessment and assess knowledge base.  Interventions:  - Provide teaching at level of understanding  - Provide teaching via preferred learning methods  Outcome: Progressing     Problem: ANTEPARTUM  Goal: Maintain pregnancy as long as maternal and/or fetal condition is stable  Description: INTERVENTIONS:  - Maternal surveillance  - Fetal surveillance  - Monitor uterine activity  - Medications as ordered  - Bedrest  Outcome: Progressing

## 2025-02-25 NOTE — PLAN OF CARE
Problem: PAIN - ADULT  Goal: Verbalizes/displays adequate comfort level or baseline comfort level  Description: Interventions:  - Encourage patient to monitor pain and request assistance  - Assess pain using appropriate pain scale  - Administer analgesics based on type and severity of pain and evaluate response  - Implement non-pharmacological measures as appropriate and evaluate response  - Consider cultural and social influences on pain and pain management  - Notify physician/advanced practitioner if interventions unsuccessful or patient reports new pain  Outcome: Progressing     Problem: INFECTION - ADULT  Goal: Absence or prevention of progression during hospitalization  Description: INTERVENTIONS:  - Assess and monitor for signs and symptoms of infection  - Monitor lab/diagnostic results  - Monitor all insertion sites, i.e. indwelling lines, tubes, and drains  - Monitor endotracheal if appropriate and nasal secretions for changes in amount and color  - [place] appropriate cooling/warming therapies per order  - Administer medications as ordered  - Instruct and encourage patient and family to use good hand hygiene technique  - Identify and instruct in appropriate isolation precautions for identified infection/condition  Outcome: Progressing  Goal: Absence of fever/infection during neutropenic period  Description: INTERVENTIONS:  - Monitor WBC    Outcome: Progressing     Problem: SAFETY ADULT  Goal: Patient will remain free of falls  Description: INTERVENTIONS:  - Educate patient/family on patient safety including physical limitations  - Instruct patient to call for assistance with activity   - Consult OT/PT to assist with strengthening/mobility   - Keep Call bell within reach  - Keep bed low and locked with side rails adjusted as appropriate  - Keep care items and personal belongings within reach  - Initiate and maintain comfort rounds  - Make Fall Risk Sign visible to staff  - Offer Toileting every  Hours,  in advance of need  - Initiate/Maintain alarm  - Obtain necessary fall risk management equipment:   - Apply yellow socks and bracelet for high fall risk patients  - Consider moving patient to room near nurses station  Outcome: Progressing  Goal: Maintain or return to baseline ADL function  Description: INTERVENTIONS:  -  Assess patient's ability to carry out ADLs; assess patient's baseline for ADL function and identify physical deficits which impact ability to perform ADLs (bathing, care of mouth/teeth, toileting, grooming, dressing, etc.)  - Assess/evaluate cause of self-care deficits   - Assess range of motion  - Assess patient's mobility; develop plan if impaired  - Assess patient's need for assistive devices and provide as appropriate  - Encourage maximum independence but intervene and supervise when necessary  - Involve family in performance of ADLs  - Assess for home care needs following discharge   - Consider OT consult to assist with ADL evaluation and planning for discharge  - Provide patient education as appropriate  Outcome: Progressing  Goal: Maintains/Returns to pre admission functional level  Description: INTERVENTIONS:  - Perform AM-PAC 6 Click Basic Mobility/ Daily Activity assessment daily.  - Set and communicate daily mobility goal to care team and patient/family/caregiver.   - Collaborate with rehabilitation services on mobility goals if consulted  - Perform Range of Motion  times a day.  - Reposition patient every  hours.  - Dangle patient  times a day  - Stand patient  times a day  - Ambulate patient  times a day  - Out of bed to chair  times a day   - Out of bed for meals times a day  - Out of bed for toileting  - Record patient progress and toleration of activity level   Outcome: Progressing     Problem: DISCHARGE PLANNING  Goal: Discharge to home or other facility with appropriate resources  Description: INTERVENTIONS:  - Identify barriers to discharge w/patient and caregiver  - Arrange for  needed discharge resources and transportation as appropriate  - Identify discharge learning needs (meds, wound care, etc.)  - Arrange for interpretive services to assist at discharge as needed  - Refer to Case Management Department for coordinating discharge planning if the patient needs post-hospital services based on physician/advanced practitioner order or complex needs related to functional status, cognitive ability, or social support system  Outcome: Progressing     Problem: Knowledge Deficit  Goal: Patient/family/caregiver demonstrates understanding of disease process, treatment plan, medications, and discharge instructions  Description: Complete learning assessment and assess knowledge base.  Interventions:  - Provide teaching at level of understanding  - Provide teaching via preferred learning methods  Outcome: Progressing     Problem: ANTEPARTUM  Goal: Maintain pregnancy as long as maternal and/or fetal condition is stable  Description: INTERVENTIONS:  - Maternal surveillance  - Fetal surveillance  - Monitor uterine activity  - Medications as ordered  - Bedrest  Outcome: Progressing

## 2025-02-26 PROCEDURE — 76818 FETAL BIOPHYS PROFILE W/NST: CPT | Performed by: OBSTETRICS & GYNECOLOGY

## 2025-02-26 PROCEDURE — 99232 SBSQ HOSP IP/OBS MODERATE 35: CPT | Performed by: OBSTETRICS & GYNECOLOGY

## 2025-02-26 PROCEDURE — NC001 PR NO CHARGE: Performed by: OBSTETRICS & GYNECOLOGY

## 2025-02-26 RX ADMIN — METRONIDAZOLE 500 MG: 500 TABLET ORAL at 22:14

## 2025-02-26 RX ADMIN — METRONIDAZOLE 500 MG: 500 TABLET ORAL at 08:56

## 2025-02-26 RX ADMIN — AMOXICILLIN 500 MG: 250 CAPSULE ORAL at 21:20

## 2025-02-26 RX ADMIN — AMOXICILLIN 500 MG: 250 CAPSULE ORAL at 05:13

## 2025-02-26 RX ADMIN — AMOXICILLIN 500 MG: 250 CAPSULE ORAL at 14:07

## 2025-02-26 NOTE — PROGRESS NOTES
Progress Note - M  Name: Yvette Siddiqui 39 y.o. female I MRN: [id number]  Unit/Bed#: -01 I Date of Admission: 2025   Date of Service: 2025 I Hospital Day: 2     Assessment & Plan   premature rupture of membranes (PPROM)  Presented to triage at 33w3d w/  complaint of leakage of fluid and contractions  Pelvic exam : grossly ruptured with positive pooling, nitrazine positive, negative ferning  Wet mt/microscopy: positive for clue cells, budding yeast  Otherwise hemodynamically stable with no signs of chorioamnionitis , fundal tenderness, or fever  No signs of abruption   S/p loading dose of Procardia, tocolysis held thereafter  Speculum exam on  not concerning for infection or labor    Plan:   - Latency antibiotics x 7 days: 1g Azithromycin x 1 + 2g Ampicillin Q6h IV x 48 hours followed by Amoxicillin 500mg PO TID for 5 days (until 3/2)  - Betamethasone for fetal lung maturation: -  - Infectious workup: GBS neg, Urine culture neg, GC negative on   - Delivery at 34 weeks, sooner if clinically indicated   Antepartum multigravida of advanced maternal age    Gonorrhea  Hx of gonorrhea tx 2024  Negative HANK on 24  GC collected on admission       33 weeks gestation of pregnancy  All prenatal labs completed and normal at [facility], 28 week labs were done at 25 weeks NML   Growth scan 25 13%, vertex   SVE as admission /-3> unchanged at recheck 2 hrs after admission    Plan:   Delivery plan : repeat  section delivery at 34wks ( see PPROM prob list) pending  scheduling for 2025 at 0800  NICU consulted and made aware at admission   Continuous fetal monitoring, consider NST TID is CAT I is sustained and no concern for progression into labor  Encounter for consultation for female sterilization  Desires permanent sterilization at time of RTS,  signed though patient has private insurance  History of  delivery   X 3  Desires RTLCS,  scheduled for 25 at 0800, or earlier if indicated    Vaginitis  Hx of recurrent bacterial vaginosis   Clue cells on wet mt on admission  as well as budding yeast      Plan:   Bacterial vaginosis: Metronidazole 500mg BID X 7 days   Yeast infection: 1 dose Diflucan 200mg     contractions  SVE /-3 and unchanged at 2 hr recheck on admission  S/P procardia 30mg bolus, discontinued thereafter  [place]: contraction Q 3-4     Plan:   Reg diet  See plan under PPROM    OB L&D Progress Note  Subjective   Patient denies cramping, vaginal bleeding, leakage of fluid, and feels normal fetal movement.    Objective :  Temp:  [97.8 °F (36.6 °C)-98.9 °F (37.2 °C)] 98.1 °F (36.7 °C)  HR:  [] 79  BP: ()/(51-98) 102/51  Resp:  [18-20] 18  SpO2:  [98 %-100 %] 98 %  O2 Device: None (Room air)    Physical Exam  GEN: The patient was alert and oriented x3, pleasant well-appearing female in no acute distress.   CV: Regular rate  PULM: nonlabored respirations  MSK: Normal gait  Skin: warm, dry  Neuro: no focal deficits  Psych: normal affect and judgement, cooperative  Speculum: clear vaginal fluid without odor, cervix appears closed    FHT : 130 bpm, moderate variability, no accelerations, no decelerations, NST ongoing    Silvana Castle  OBGYN PGY3

## 2025-02-26 NOTE — ASSESSMENT & PLAN NOTE
SVE 1/50/-3 and unchanged at 2 hr recheck on admission  S/P procardia 30mg bolus, discontinued thereafter  [place]: contraction Q 3-4     Plan:   Reg diet  See plan under PPROM

## 2025-02-26 NOTE — ASSESSMENT & PLAN NOTE
Desires permanent sterilization at time of RTLCS, MA-31 signed though patient has private insurance

## 2025-02-26 NOTE — PLAN OF CARE
Problem: PAIN - ADULT  Goal: Verbalizes/displays adequate comfort level or baseline comfort level  Description: Interventions:  - Encourage patient to monitor pain and request assistance  - Assess pain using appropriate pain scale  - Administer analgesics based on type and severity of pain and evaluate response  - Implement non-pharmacological measures as appropriate and evaluate response  - Consider cultural and social influences on pain and pain management  - Notify physician/advanced practitioner if interventions unsuccessful or patient reports new pain  Outcome: Progressing     Problem: INFECTION - ADULT  Goal: Absence or prevention of progression during hospitalization  Description: INTERVENTIONS:  - Assess and monitor for signs and symptoms of infection  - Monitor lab/diagnostic results  - Monitor all insertion sites, i.e. indwelling lines, tubes, and drains  - Monitor endotracheal if appropriate and nasal secretions for changes in amount and color  - [place] appropriate cooling/warming therapies per order  - Administer medications as ordered  - Instruct and encourage patient and family to use good hand hygiene technique  - Identify and instruct in appropriate isolation precautions for identified infection/condition  Outcome: Progressing  Goal: Absence of fever/infection during neutropenic period  Description: INTERVENTIONS:  - Monitor WBC    Outcome: Progressing     Problem: SAFETY ADULT  Goal: Patient will remain free of falls  Description: INTERVENTIONS:  - Educate patient/family on patient safety including physical limitations  - Instruct patient to call for assistance with activity   - Consult OT/PT to assist with strengthening/mobility   - Keep Call bell within reach  - Keep bed low and locked with side rails adjusted as appropriate  - Keep care items and personal belongings within reach  - Initiate and maintain comfort rounds  - Apply yellow socks and bracelet for high fall risk patients  - Consider  moving patient to room near nurses station  Outcome: Progressing  Goal: Maintain or return to baseline ADL function  Description: INTERVENTIONS:  -  Assess patient's ability to carry out ADLs; assess patient's baseline for ADL function and identify physical deficits which impact ability to perform ADLs (bathing, care of mouth/teeth, toileting, grooming, dressing, etc.)  - Assess/evaluate cause of self-care deficits   - Assess range of motion  - Assess patient's mobility; develop plan if impaired  - Assess patient's need for assistive devices and provide as appropriate  - Encourage maximum independence but intervene and supervise when necessary  - Involve family in performance of ADLs  - Assess for home care needs following discharge   - Consider OT consult to assist with ADL evaluation and planning for discharge  - Provide patient education as appropriate  Outcome: Progressing  Goal: Maintains/Returns to pre admission functional level  Description: INTERVENTIONS:  - Perform AM-PAC 6 Click Basic Mobility/ Daily Activity assessment daily.  - Set and communicate daily mobility goal to care team and patient/family/caregiver.   - Collaborate with rehabilitation services on mobility goals if consulted    - Out of bed for toileting  - Record patient progress and toleration of activity level   Outcome: Progressing     Problem: DISCHARGE PLANNING  Goal: Discharge to home or other facility with appropriate resources  Description: INTERVENTIONS:  - Identify barriers to discharge w/patient and caregiver  - Arrange for needed discharge resources and transportation as appropriate  - Identify discharge learning needs (meds, wound care, etc.)  - Arrange for interpretive services to assist at discharge as needed  - Refer to Case Management Department for coordinating discharge planning if the patient needs post-hospital services based on physician/advanced practitioner order or complex needs related to functional status, cognitive  ability, or social support system  Outcome: Progressing     Problem: Knowledge Deficit  Goal: Patient/family/caregiver demonstrates understanding of disease process, treatment plan, medications, and discharge instructions  Description: Complete learning assessment and assess knowledge base.  Interventions:  - Provide teaching at level of understanding  - Provide teaching via preferred learning methods  Outcome: Progressing     Problem: ANTEPARTUM  Goal: Maintain pregnancy as long as maternal and/or fetal condition is stable  Description: INTERVENTIONS:  - Maternal surveillance  - Fetal surveillance  - Monitor uterine activity  - Medications as ordered  - Bedrest  Outcome: Progressing

## 2025-02-26 NOTE — ASSESSMENT & PLAN NOTE
Presented to triage at 33w3d w/  complaint of leakage of fluid and contractions  Pelvic exam : grossly ruptured with positive pooling, nitrazine positive, negative ferning  Wet mt/microscopy: positive for clue cells, budding yeast  Otherwise hemodynamically stable with no signs of chorioamnionitis , fundal tenderness, or fever  No signs of abruption   S/p loading dose of Procardia, tocolysis held thereafter  Speculum exam on 2/25 not concerning for infection or labor    Plan:   - Latency antibiotics x 7 days: 1g Azithromycin x 1 + 2g Ampicillin Q6h IV x 48 hours followed by Amoxicillin 500mg PO TID for 5 days (until 3/2)  - Betamethasone for fetal lung maturation: 2/24-2/25  - Infectious workup: GBS neg, Urine culture neg, GC negative on 2/24  - Delivery at 34 weeks, sooner if clinically indicated

## 2025-02-26 NOTE — PLAN OF CARE
Problem: PAIN - ADULT  Goal: Verbalizes/displays adequate comfort level or baseline comfort level  Description: Interventions:  - Encourage patient to monitor pain and request assistance  - Assess pain using appropriate pain scale  - Administer analgesics based on type and severity of pain and evaluate response  - Implement non-pharmacological measures as appropriate and evaluate response  - Consider cultural and social influences on pain and pain management  - Notify physician/advanced practitioner if interventions unsuccessful or patient reports new pain  Outcome: Progressing     Problem: INFECTION - ADULT  Goal: Absence or prevention of progression during hospitalization  Description: INTERVENTIONS:  - Assess and monitor for signs and symptoms of infection  - Monitor lab/diagnostic results  - Monitor all insertion sites, i.e. indwelling lines, tubes, and drains  - Monitor endotracheal if appropriate and nasal secretions for changes in amount and color  - [place] appropriate cooling/warming therapies per order  - Administer medications as ordered  - Instruct and encourage patient and family to use good hand hygiene technique  - Identify and instruct in appropriate isolation precautions for identified infection/condition  Outcome: Progressing  Goal: Absence of fever/infection during neutropenic period  Description: INTERVENTIONS:  - Monitor WBC    Outcome: Progressing     Problem: SAFETY ADULT  Goal: Patient will remain free of falls  Description: INTERVENTIONS:  - Educate patient/family on patient safety including physical limitations  - Instruct patient to call for assistance with activity   - Consult OT/PT to assist with strengthening/mobility   - Keep Call bell within reach  - Keep bed low and locked with side rails adjusted as appropriate  - Keep care items and personal belongings within reach  - Initiate and maintain comfort rounds  - Make Fall Risk Sign visible to staff  - Apply yellow socks and bracelet  for high fall risk patients  - Consider moving patient to room near nurses station  Outcome: Progressing  Goal: Maintain or return to baseline ADL function  Description: INTERVENTIONS:  -  Assess patient's ability to carry out ADLs; assess patient's baseline for ADL function and identify physical deficits which impact ability to perform ADLs (bathing, care of mouth/teeth, toileting, grooming, dressing, etc.)  - Assess/evaluate cause of self-care deficits   - Assess range of motion  - Assess patient's mobility; develop plan if impaired  - Assess patient's need for assistive devices and provide as appropriate  - Encourage maximum independence but intervene and supervise when necessary  - Involve family in performance of ADLs  - Assess for home care needs following discharge   - Consider OT consult to assist with ADL evaluation and planning for discharge  - Provide patient education as appropriate  Outcome: Progressing  Goal: Maintains/Returns to pre admission functional level  Description: INTERVENTIONS:  - Perform AM-PAC 6 Click Basic Mobility/ Daily Activity assessment daily.  - Set and communicate daily mobility goal to care team and patient/family/caregiver.   - Collaborate with rehabilitation services on mobility goals if consulted  - Out of bed for toileting  - Record patient progress and toleration of activity level   Outcome: Progressing     Problem: DISCHARGE PLANNING  Goal: Discharge to home or other facility with appropriate resources  Description: INTERVENTIONS:  - Identify barriers to discharge w/patient and caregiver  - Arrange for needed discharge resources and transportation as appropriate  - Identify discharge learning needs (meds, wound care, etc.)  - Arrange for interpretive services to assist at discharge as needed  - Refer to Case Management Department for coordinating discharge planning if the patient needs post-hospital services based on physician/advanced practitioner order or complex needs  related to functional status, cognitive ability, or social support system  Outcome: Progressing     Problem: Knowledge Deficit  Goal: Patient/family/caregiver demonstrates understanding of disease process, treatment plan, medications, and discharge instructions  Description: Complete learning assessment and assess knowledge base.  Interventions:  - Provide teaching at level of understanding  - Provide teaching via preferred learning methods  Outcome: Progressing     Problem: ANTEPARTUM  Goal: Maintain pregnancy as long as maternal and/or fetal condition is stable  Description: INTERVENTIONS:  - Maternal surveillance  - Fetal surveillance  - Monitor uterine activity  - Medications as ordered  - Bedrest  Outcome: Progressing

## 2025-02-27 ENCOUNTER — ANESTHESIA EVENT (INPATIENT)
Dept: LABOR AND DELIVERY | Facility: HOSPITAL | Age: 40
End: 2025-02-27
Payer: COMMERCIAL

## 2025-02-27 ENCOUNTER — ANESTHESIA (INPATIENT)
Dept: LABOR AND DELIVERY | Facility: HOSPITAL | Age: 40
End: 2025-02-27
Payer: COMMERCIAL

## 2025-02-27 PROBLEM — M41.9 SCOLIOSIS: Status: ACTIVE | Noted: 2025-02-27

## 2025-02-27 LAB
ABO GROUP BLD: NORMAL
BASE EXCESS BLDCOA CALC-SCNC: -2.1 MMOL/L (ref 3–11)
BASE EXCESS BLDCOV CALC-SCNC: -0.6 MMOL/L (ref 1–9)
BLD GP AB SCN SERPL QL: NEGATIVE
ERYTHROCYTE [DISTWIDTH] IN BLOOD BY AUTOMATED COUNT: 13.4 % (ref 11.6–15.1)
HCO3 BLDCOA-SCNC: 24.7 MMOL/L (ref 17.3–27.3)
HCO3 BLDCOV-SCNC: 25.4 MMOL/L (ref 12.2–28.6)
HCT VFR BLD AUTO: 33.6 % (ref 34.8–46.1)
HGB BLD-MCNC: 10.9 G/DL (ref 11.5–15.4)
MCH RBC QN AUTO: 27.7 PG (ref 26.8–34.3)
MCHC RBC AUTO-ENTMCNC: 32.4 G/DL (ref 31.4–37.4)
MCV RBC AUTO: 86 FL (ref 82–98)
OXYHGB MFR BLDCOA: 55.2 %
OXYHGB MFR BLDCOV: 69.9 %
PCO2 BLDCOA: 49.6 MM[HG] (ref 30–60)
PCO2 BLDCOV: 46.7 MM HG (ref 27–43)
PH BLDCOA: 7.32 [PH] (ref 7.23–7.43)
PH BLDCOV: 7.35 [PH] (ref 7.19–7.49)
PLATELET # BLD AUTO: 484 THOUSANDS/UL (ref 149–390)
PMV BLD AUTO: 9.1 FL (ref 8.9–12.7)
PO2 BLDCOA: <10 MM HG (ref 5–25)
PO2 BLDCOV: 28.9 MM HG (ref 15–45)
RBC # BLD AUTO: 3.93 MILLION/UL (ref 3.81–5.12)
RH BLD: POSITIVE
SAO2 % BLDCOV: 16.7 ML/DL
SPECIMEN EXPIRATION DATE: NORMAL
WBC # BLD AUTO: 10.6 THOUSAND/UL (ref 4.31–10.16)

## 2025-02-27 PROCEDURE — 0UB70ZZ EXCISION OF BILATERAL FALLOPIAN TUBES, OPEN APPROACH: ICD-10-PCS

## 2025-02-27 PROCEDURE — 88302 TISSUE EXAM BY PATHOLOGIST: CPT | Performed by: PATHOLOGY

## 2025-02-27 PROCEDURE — 85027 COMPLETE CBC AUTOMATED: CPT

## 2025-02-27 PROCEDURE — 58611 LIGATE OVIDUCT(S) ADD-ON: CPT | Performed by: OBSTETRICS & GYNECOLOGY

## 2025-02-27 PROCEDURE — 86923 COMPATIBILITY TEST ELECTRIC: CPT

## 2025-02-27 PROCEDURE — 86901 BLOOD TYPING SEROLOGIC RH(D): CPT

## 2025-02-27 PROCEDURE — NC001 PR NO CHARGE: Performed by: OBSTETRICS & GYNECOLOGY

## 2025-02-27 PROCEDURE — 86850 RBC ANTIBODY SCREEN: CPT

## 2025-02-27 PROCEDURE — A6250 SKIN SEAL PROTECT MOISTURIZR: HCPCS | Performed by: OBSTETRICS & GYNECOLOGY

## 2025-02-27 PROCEDURE — 88307 TISSUE EXAM BY PATHOLOGIST: CPT | Performed by: PATHOLOGY

## 2025-02-27 PROCEDURE — 82805 BLOOD GASES W/O2 SATURATION: CPT | Performed by: OBSTETRICS & GYNECOLOGY

## 2025-02-27 PROCEDURE — 86900 BLOOD TYPING SEROLOGIC ABO: CPT

## 2025-02-27 PROCEDURE — 59514 CESAREAN DELIVERY ONLY: CPT | Performed by: OBSTETRICS & GYNECOLOGY

## 2025-02-27 RX ORDER — FENTANYL CITRATE/PF 50 MCG/ML
25 SYRINGE (ML) INJECTION
Status: DISCONTINUED | OUTPATIENT
Start: 2025-02-27 | End: 2025-03-03 | Stop reason: HOSPADM

## 2025-02-27 RX ORDER — SODIUM CHLORIDE, SODIUM LACTATE, POTASSIUM CHLORIDE, CALCIUM CHLORIDE 600; 310; 30; 20 MG/100ML; MG/100ML; MG/100ML; MG/100ML
125 INJECTION, SOLUTION INTRAVENOUS CONTINUOUS
Status: DISCONTINUED | OUTPATIENT
Start: 2025-02-27 | End: 2025-03-03 | Stop reason: HOSPADM

## 2025-02-27 RX ORDER — CEFAZOLIN SODIUM 2 G/50ML
2000 SOLUTION INTRAVENOUS ONCE
Status: COMPLETED | OUTPATIENT
Start: 2025-02-27 | End: 2025-02-27

## 2025-02-27 RX ORDER — BUPIVACAINE HYDROCHLORIDE 7.5 MG/ML
INJECTION, SOLUTION INTRASPINAL AS NEEDED
Status: DISCONTINUED | OUTPATIENT
Start: 2025-02-27 | End: 2025-02-27

## 2025-02-27 RX ORDER — IBUPROFEN 600 MG/1
600 TABLET, FILM COATED ORAL EVERY 6 HOURS
Status: DISCONTINUED | OUTPATIENT
Start: 2025-02-28 | End: 2025-03-03 | Stop reason: HOSPADM

## 2025-02-27 RX ORDER — DIPHENHYDRAMINE HYDROCHLORIDE 50 MG/ML
12.5 INJECTION INTRAMUSCULAR; INTRAVENOUS ONCE AS NEEDED
Status: DISCONTINUED | OUTPATIENT
Start: 2025-02-27 | End: 2025-03-03 | Stop reason: HOSPADM

## 2025-02-27 RX ORDER — KETOROLAC TROMETHAMINE 30 MG/ML
15 INJECTION, SOLUTION INTRAMUSCULAR; INTRAVENOUS EVERY 6 HOURS SCHEDULED
Status: COMPLETED | OUTPATIENT
Start: 2025-02-27 | End: 2025-02-28

## 2025-02-27 RX ORDER — KETOROLAC TROMETHAMINE 30 MG/ML
15 INJECTION, SOLUTION INTRAMUSCULAR; INTRAVENOUS EVERY 6 HOURS PRN
Status: DISCONTINUED | OUTPATIENT
Start: 2025-02-27 | End: 2025-02-27

## 2025-02-27 RX ORDER — NALBUPHINE HYDROCHLORIDE 10 MG/ML
5 INJECTION INTRAMUSCULAR; INTRAVENOUS; SUBCUTANEOUS
Status: ACTIVE | OUTPATIENT
Start: 2025-02-27 | End: 2025-02-28

## 2025-02-27 RX ORDER — ONDANSETRON 2 MG/ML
4 INJECTION INTRAMUSCULAR; INTRAVENOUS ONCE AS NEEDED
Status: DISCONTINUED | OUTPATIENT
Start: 2025-02-27 | End: 2025-03-03 | Stop reason: HOSPADM

## 2025-02-27 RX ORDER — OXYCODONE HYDROCHLORIDE 10 MG/1
10 TABLET ORAL EVERY 4 HOURS PRN
Refills: 0 | Status: DISCONTINUED | OUTPATIENT
Start: 2025-02-27 | End: 2025-03-03 | Stop reason: HOSPADM

## 2025-02-27 RX ORDER — DIPHENHYDRAMINE HCL 25 MG
25 TABLET ORAL EVERY 6 HOURS PRN
Status: DISCONTINUED | OUTPATIENT
Start: 2025-02-27 | End: 2025-03-03 | Stop reason: HOSPADM

## 2025-02-27 RX ORDER — ONDANSETRON 2 MG/ML
INJECTION INTRAMUSCULAR; INTRAVENOUS AS NEEDED
Status: DISCONTINUED | OUTPATIENT
Start: 2025-02-27 | End: 2025-02-27

## 2025-02-27 RX ORDER — ONDANSETRON 2 MG/ML
4 INJECTION INTRAMUSCULAR; INTRAVENOUS EVERY 8 HOURS PRN
Status: DISCONTINUED | OUTPATIENT
Start: 2025-02-27 | End: 2025-03-03 | Stop reason: HOSPADM

## 2025-02-27 RX ORDER — SODIUM CHLORIDE, SODIUM LACTATE, POTASSIUM CHLORIDE, CALCIUM CHLORIDE 600; 310; 30; 20 MG/100ML; MG/100ML; MG/100ML; MG/100ML
INJECTION, SOLUTION INTRAVENOUS CONTINUOUS PRN
Status: DISCONTINUED | OUTPATIENT
Start: 2025-02-27 | End: 2025-02-27

## 2025-02-27 RX ORDER — BENZOCAINE/MENTHOL 6 MG-10 MG
1 LOZENGE MUCOUS MEMBRANE DAILY PRN
Status: DISCONTINUED | OUTPATIENT
Start: 2025-02-27 | End: 2025-03-03 | Stop reason: HOSPADM

## 2025-02-27 RX ORDER — ACETAMINOPHEN 325 MG/1
975 TABLET ORAL EVERY 6 HOURS SCHEDULED
Status: DISPENSED | OUTPATIENT
Start: 2025-02-27 | End: 2025-03-02

## 2025-02-27 RX ORDER — CALCIUM CARBONATE 500 MG/1
1000 TABLET, CHEWABLE ORAL DAILY PRN
Status: DISCONTINUED | OUTPATIENT
Start: 2025-02-27 | End: 2025-03-03 | Stop reason: HOSPADM

## 2025-02-27 RX ORDER — NALOXONE HYDROCHLORIDE 0.4 MG/ML
0.1 INJECTION, SOLUTION INTRAMUSCULAR; INTRAVENOUS; SUBCUTANEOUS
Status: ACTIVE | OUTPATIENT
Start: 2025-02-27 | End: 2025-02-28

## 2025-02-27 RX ORDER — MORPHINE SULFATE 0.5 MG/ML
INJECTION, SOLUTION EPIDURAL; INTRATHECAL; INTRAVENOUS AS NEEDED
Status: DISCONTINUED | OUTPATIENT
Start: 2025-02-27 | End: 2025-02-27

## 2025-02-27 RX ORDER — KETOROLAC TROMETHAMINE 30 MG/ML
INJECTION, SOLUTION INTRAMUSCULAR; INTRAVENOUS AS NEEDED
Status: DISCONTINUED | OUTPATIENT
Start: 2025-02-27 | End: 2025-02-27

## 2025-02-27 RX ORDER — HYDROMORPHONE HCL/PF 1 MG/ML
0.5 SYRINGE (ML) INJECTION EVERY 2 HOUR PRN
Refills: 0 | Status: DISCONTINUED | OUTPATIENT
Start: 2025-02-27 | End: 2025-03-03 | Stop reason: HOSPADM

## 2025-02-27 RX ORDER — HYDROMORPHONE HCL/PF 1 MG/ML
0.5 SYRINGE (ML) INJECTION
Status: DISCONTINUED | OUTPATIENT
Start: 2025-02-27 | End: 2025-03-03 | Stop reason: HOSPADM

## 2025-02-27 RX ORDER — OXYTOCIN/RINGER'S LACTATE 30/500 ML
62.5 PLASTIC BAG, INJECTION (ML) INTRAVENOUS ONCE
Status: COMPLETED | OUTPATIENT
Start: 2025-02-27 | End: 2025-02-27

## 2025-02-27 RX ORDER — TRANEXAMIC ACID 10 MG/ML
INJECTION, SOLUTION INTRAVENOUS AS NEEDED
Status: DISCONTINUED | OUTPATIENT
Start: 2025-02-27 | End: 2025-02-27

## 2025-02-27 RX ORDER — OXYCODONE HYDROCHLORIDE 5 MG/1
5 TABLET ORAL EVERY 4 HOURS PRN
Refills: 0 | Status: DISCONTINUED | OUTPATIENT
Start: 2025-02-27 | End: 2025-03-03 | Stop reason: HOSPADM

## 2025-02-27 RX ORDER — OXYTOCIN/RINGER'S LACTATE 30/500 ML
PLASTIC BAG, INJECTION (ML) INTRAVENOUS CONTINUOUS PRN
Status: DISCONTINUED | OUTPATIENT
Start: 2025-02-27 | End: 2025-02-27

## 2025-02-27 RX ORDER — DOCUSATE SODIUM 100 MG/1
100 CAPSULE, LIQUID FILLED ORAL 2 TIMES DAILY
Status: DISCONTINUED | OUTPATIENT
Start: 2025-02-27 | End: 2025-03-03 | Stop reason: HOSPADM

## 2025-02-27 RX ORDER — FENTANYL CITRATE 50 UG/ML
INJECTION, SOLUTION INTRAMUSCULAR; INTRAVENOUS AS NEEDED
Status: DISCONTINUED | OUTPATIENT
Start: 2025-02-27 | End: 2025-02-27

## 2025-02-27 RX ADMIN — SODIUM CHLORIDE, SODIUM LACTATE, POTASSIUM CHLORIDE, AND CALCIUM CHLORIDE: .6; .31; .03; .02 INJECTION, SOLUTION INTRAVENOUS at 09:16

## 2025-02-27 RX ADMIN — SODIUM CHLORIDE, SODIUM LACTATE, POTASSIUM CHLORIDE, AND CALCIUM CHLORIDE: .6; .31; .03; .02 INJECTION, SOLUTION INTRAVENOUS at 08:21

## 2025-02-27 RX ADMIN — MORPHINE SULFATE 0.15 MG: 0.5 INJECTION, SOLUTION EPIDURAL; INTRATHECAL; INTRAVENOUS at 08:40

## 2025-02-27 RX ADMIN — FENTANYL CITRATE 15 MCG: 50 INJECTION INTRAMUSCULAR; INTRAVENOUS at 08:40

## 2025-02-27 RX ADMIN — TRANEXAMIC ACID 1000 MG: 10 INJECTION, SOLUTION INTRAVENOUS at 09:09

## 2025-02-27 RX ADMIN — KETOROLAC TROMETHAMINE 15 MG: 30 INJECTION, SOLUTION INTRAMUSCULAR; INTRAVENOUS at 17:37

## 2025-02-27 RX ADMIN — OXYTOCIN 62.5 MILLI-UNITS/MIN: 10 INJECTION INTRAVENOUS at 11:03

## 2025-02-27 RX ADMIN — ACETAMINOPHEN 975 MG: 325 TABLET, FILM COATED ORAL at 13:21

## 2025-02-27 RX ADMIN — CEFAZOLIN SODIUM 2000 MG: 2 SOLUTION INTRAVENOUS at 08:24

## 2025-02-27 RX ADMIN — Medication 250 MILLI-UNITS/MIN: at 09:07

## 2025-02-27 RX ADMIN — DOCUSATE SODIUM 100 MG: 100 CAPSULE, LIQUID FILLED ORAL at 17:37

## 2025-02-27 RX ADMIN — KETOROLAC TROMETHAMINE 15 MG: 30 INJECTION, SOLUTION INTRAMUSCULAR; INTRAVENOUS at 23:47

## 2025-02-27 RX ADMIN — SODIUM CHLORIDE, SODIUM LACTATE, POTASSIUM CHLORIDE, AND CALCIUM CHLORIDE 500 ML: .6; .31; .03; .02 INJECTION, SOLUTION INTRAVENOUS at 04:53

## 2025-02-27 RX ADMIN — KETOROLAC TROMETHAMINE 30 MG: 30 INJECTION, SOLUTION INTRAMUSCULAR; INTRAVENOUS at 09:46

## 2025-02-27 RX ADMIN — METRONIDAZOLE 500 MG: 500 TABLET ORAL at 20:56

## 2025-02-27 RX ADMIN — BUPIVACAINE HYDROCHLORIDE IN DEXTROSE 1.6 ML: 7.5 INJECTION, SOLUTION SUBARACHNOID at 08:40

## 2025-02-27 RX ADMIN — ONDANSETRON 4 MG: 2 INJECTION INTRAMUSCULAR; INTRAVENOUS at 08:58

## 2025-02-27 RX ADMIN — FENTANYL CITRATE 25 MCG: 50 INJECTION INTRAMUSCULAR; INTRAVENOUS at 12:00

## 2025-02-27 RX ADMIN — SODIUM CHLORIDE, SODIUM LACTATE, POTASSIUM CHLORIDE, AND CALCIUM CHLORIDE 125 ML/HR: .6; .31; .03; .02 INJECTION, SOLUTION INTRAVENOUS at 19:09

## 2025-02-27 RX ADMIN — AZITHROMYCIN MONOHYDRATE 500 MG: 500 INJECTION, POWDER, LYOPHILIZED, FOR SOLUTION INTRAVENOUS at 08:34

## 2025-02-27 RX ADMIN — PHENYLEPHRINE HYDROCHLORIDE 30 MCG/MIN: 50 INJECTION INTRAVENOUS at 08:35

## 2025-02-27 RX ADMIN — SODIUM CHLORIDE, SODIUM LACTATE, POTASSIUM CHLORIDE, AND CALCIUM CHLORIDE: .6; .31; .03; .02 INJECTION, SOLUTION INTRAVENOUS at 08:20

## 2025-02-27 RX ADMIN — ACETAMINOPHEN 975 MG: 325 TABLET, FILM COATED ORAL at 19:09

## 2025-02-27 NOTE — PLAN OF CARE
Problem: PAIN - ADULT  Goal: Verbalizes/displays adequate comfort level or baseline comfort level  Description: Interventions:  - Encourage patient to monitor pain and request assistance  - Assess pain using appropriate pain scale  - Administer analgesics based on type and severity of pain and evaluate response  - Implement non-pharmacological measures as appropriate and evaluate response  - Consider cultural and social influences on pain and pain management  - Notify physician/advanced practitioner if interventions unsuccessful or patient reports new pain  Outcome: Progressing     Problem: INFECTION - ADULT  Goal: Absence or prevention of progression during hospitalization  Description: INTERVENTIONS:  - Assess and monitor for signs and symptoms of infection  - Monitor lab/diagnostic results  - Monitor all insertion sites, i.e. indwelling lines, tubes, and drains  - Monitor endotracheal if appropriate and nasal secretions for changes in amount and color  - [place] appropriate cooling/warming therapies per order  - Administer medications as ordered  - Instruct and encourage patient and family to use good hand hygiene technique  - Identify and instruct in appropriate isolation precautions for identified infection/condition  Outcome: Progressing  Goal: Absence of fever/infection during neutropenic period  Description: INTERVENTIONS:  - Monitor WBC    Outcome: Progressing     Problem: SAFETY ADULT  Goal: Patient will remain free of falls  Description: INTERVENTIONS:  - Educate patient/family on patient safety including physical limitations  - Instruct patient to call for assistance with activity   - Consult OT/PT to assist with strengthening/mobility   - Keep Call bell within reach  - Keep bed low and locked with side rails adjusted as appropriate  - Keep care items and personal belongings within reach  - Initiate and maintain comfort rounds  - Make Fall Risk Sign visible to staff  - Apply yellow socks and bracelet  for high fall risk patients  - Consider moving patient to room near nurses station  Outcome: Progressing  Goal: Maintain or return to baseline ADL function  Description: INTERVENTIONS:  -  Assess patient's ability to carry out ADLs; assess patient's baseline for ADL function and identify physical deficits which impact ability to perform ADLs (bathing, care of mouth/teeth, toileting, grooming, dressing, etc.)  - Assess/evaluate cause of self-care deficits   - Assess range of motion  - Assess patient's mobility; develop plan if impaired  - Assess patient's need for assistive devices and provide as appropriate  - Encourage maximum independence but intervene and supervise when necessary  - Involve family in performance of ADLs  - Assess for home care needs following discharge   - Consider OT consult to assist with ADL evaluation and planning for discharge  - Provide patient education as appropriate  Outcome: Progressing  Goal: Maintains/Returns to pre admission functional level  Description: INTERVENTIONS:  - Perform AM-PAC 6 Click Basic Mobility/ Daily Activity assessment daily.  - Set and communicate daily mobility goal to care team and patient/family/caregiver.   - Collaborate with rehabilitation services on mobility goals if consulted  - Out of bed for toileting  - Record patient progress and toleration of activity level   Outcome: Progressing     Problem: DISCHARGE PLANNING  Goal: Discharge to home or other facility with appropriate resources  Description: INTERVENTIONS:  - Identify barriers to discharge w/patient and caregiver  - Arrange for needed discharge resources and transportation as appropriate  - Identify discharge learning needs (meds, wound care, etc.)  - Arrange for interpretive services to assist at discharge as needed  - Refer to Case Management Department for coordinating discharge planning if the patient needs post-hospital services based on physician/advanced practitioner order or complex needs  related to functional status, cognitive ability, or social support system  Outcome: Progressing     Problem: Knowledge Deficit  Goal: Patient/family/caregiver demonstrates understanding of disease process, treatment plan, medications, and discharge instructions  Description: Complete learning assessment and assess knowledge base.  Interventions:  - Provide teaching at level of understanding  - Provide teaching via preferred learning methods  Outcome: Progressing     Problem: ANTEPARTUM  Goal: Maintain pregnancy as long as maternal and/or fetal condition is stable  Description: INTERVENTIONS:  - Maternal surveillance  - Fetal surveillance  - Monitor uterine activity  - Medications as ordered  - Bedrest  Outcome: Progressing

## 2025-02-27 NOTE — PROGRESS NOTES
Progress Note - M  Name: Yvette Siddiqui 39 y.o. female I MRN: [id number]  Unit/Bed#: -01 I Date of Admission: 2025   Date of Service: 2025 I Hospital Day: 3     Assessment & Plan   premature rupture of membranes (PPROM)  Presented to triage at 33w3d w/  complaint of leakage of fluid and contractions  Pelvic exam : grossly ruptured with positive pooling, nitrazine positive, negative ferning  Wet mt/microscopy: positive for clue cells, budding yeast  Otherwise hemodynamically stable with no signs of chorioamnionitis , fundal tenderness, or fever  No signs of abruption   S/p loading dose of Procardia, tocolysis held thereafter  Speculum exam on  not concerning for infection or labor    Plan:   - Latency antibiotics x 7 days: 1g Azithromycin x 1 + 2g Ampicillin Q6h IV x 48 hours followed by Amoxicillin 500mg PO TID for 5 days (until 3/2)  - Betamethasone for fetal lung maturation: -  - Infectious workup: GBS neg, Urine culture neg, GC negative on   - Delivery today via RLTCS and bilateral salpingectomy, NPO since midnight, 2g Ancef, 500mg Azithromycin  Antepartum multigravida of advanced maternal age    Gonorrhea  Hx of gonorrhea tx 2024  Negative HANK on 24  GC collected on admission       33 weeks gestation of pregnancy  [facility] week labs were done at 25 weeks NML   Growth scan 25 13%, vertex   SVE as admission /-3> unchanged at recheck 2 hrs after admission    Plan:   Delivery plan : repeat  section delivery at 34wks ( see PPROM prob list) pending  scheduling for 2025 at 0800  NICU consulted and made aware at admission   Continuous fetal monitoring, consider NST TID is CAT I is sustained and no concern for progression into labor  Encounter for consultation for female sterilization  Desires permanent sterilization at time of RTJACY  signed though patient has private insurance  History of   delivery   X 3  Desires RTLCS, scheduled for 25 at 0800, or earlier if indicated    Vaginitis  Hx of recurrent bacterial vaginosis   Clue cells on wet mt on admission  as well as budding yeast      Plan:   Bacterial vaginosis: Metronidazole 500mg BID X 7 days   Yeast infection: 1 dose Diflucan 200mg     contractions  SVE /-3 and unchanged at 2 hr recheck on admission  S/P procardia 30mg bolus, discontinued thereafter  [place]: contraction Q 3-4     Plan:   Reg diet  See plan under PPROM    OB L&D Progress Note  Subjective   Yvette Siddiqui is a 39 y.o.  with an ANA of 4/10/2025, by Last Menstrual Period at 34w0d who was admitted for PPROM. Has had some leakage of fluid and contractions. Denies vaginal bleeding and feels fetal movement.     Objective :  Temp:  [97.7 °F (36.5 °C)-98.6 °F (37 °C)] 98.2 °F (36.8 °C)  HR:  [66-92] 66  BP: ()/(52-61) 100/59  Resp:  [18] 18  SpO2:  [96 %-98 %] 97 %  O2 Device: None (Room air)    Physical Exam  GEN: The patient was alert and oriented x3, pleasant well-appearing female in no acute distress.   CV: Regular rate  PULM: nonlabored respirations  MSK: Normal gait  Skin: warm, dry  Neuro: no focal deficits  Psych: normal affect and judgement, cooperative  Speculum: clear vaginal fluid without odor, cervix appears closed    FHT : 150 bpm, moderate variability, 15x15 accelerations, no decelerations,    Silvana Castle  OBGYN PGY3

## 2025-02-27 NOTE — ANESTHESIA POSTPROCEDURE EVALUATION
Post-Op Assessment Note    CV Status:  Stable  Pain Score: 0    Pain management: adequate      Post-op block assessment: catheter intact, site cleaned and no complications   Mental Status:  Alert and awake   Hydration Status:  Euvolemic   PONV Controlled:  Controlled   Airway Patency:  Patent     Post Op Vitals Reviewed: Yes    No anethesia notable event occurred.    Staff: Anesthesiologist           Last Filed PACU Vitals:  Vitals Value Taken Time   Temp 96.4    Pulse 67    BP 95/52    Resp 14    SpO2 97% on RA

## 2025-02-27 NOTE — OP NOTE
OPERATIVE REPORT  PATIENT NAME: Yvette Siddiqui    :  1985  MRN: [id number]  Pt Location: AN L&D OR ROOM 02    SURGERY DATE: 2025    Surgeons and Role:     * Nancy Solo MD - Primary     * Silvana Castle MD - Assisting     * Douglas Han MD - Co-surgeon    Preop Diagnosis:  History of  [Z98.891]    Post-Op Diagnosis Codes:     * History of  [Z98.891]    Procedure(s) (LRB):   SECTION () REPEAT (N/A)  SALPINGECTOMY (N/A)    Specimen(s):  ID Type Source Tests Collected by Time Destination   1 :  Tissue (Placenta on Hold) OB Only Placenta TISSUE EXAM OB (PLACENTA) ONLY Nancy Solo MD 2025 09    2 : PRN Tissue Fallopian Tube, Left TISSUE EXAM Nancy Solo MD 2025 09    3 : PRN Tissue Fallopian Tube, Right TISSUE EXAM Nancy Solo MD 2025 0920    D :  Cord Blood Cord BLOOD GAS, VENOUS, CORD, BLOOD GAS, ARTERIAL, CORD Nancy Solo MD 2025 0906        Surgical QBL:  Surgical QBL (mL): 950 mL      Drains:  Urethral Catheter Double-lumen;Non-latex 16 Fr. (Active)   Site Assessment Clean;Skin intact 25 0843   Fitzgerald Care Done 25 0843   Collection Container Standard drainage bag 25 0843   Number of days: 0       Anesthesia Type:   CSE    Operative Indications:  History of  [Z98.891]  PPROM  Desires sterilization     Joselin Group Classification System:  No Multiple pregnancy, No Transverse or oblique lie, No Breech lie, Gestational age is < 37 weeks +  is JOSELIN GROUP 10    Brief History  Yvette Siddiqui is a 39 y.o.  who was admitted at 33w4d for PPROM. She was started on 7 day course of latency antibiotics, received BTM x2, and GBS was collected. Patient was scheduled for delivery via RLTCS at 34w0d with bilateral salpingectomy. She was found to have yeast and bacterial vaginosis, and was treated with single dose of fluconazole and flagyl 500mg BID x 7 days.  She had a growth scan done on admission with EFW 13%. NICU was consulted. Fetal monitoring was continuous, and changed to NST TID for patient stability. She remained stable until delivery as planned at 34w0d.    Operative Findings:  1. Viable female  at 34w0d with APGARs of 8 and 9 at 1 and 5 minutes. Fetus weighted 4lb 3oz.  2. Clear amniotic fluid  3. Normal intact placenta with centrally inserted 3VC.  4. Normal uterus though with window in lower segment. Bilateral tubes and ovaries  5. Adhesions present between subcutaneous fat and rectus fascia, with adhesion between right fallopian tube and anterior abdominal wall as well as between bowel and anterior abdominal wall    Umbilical Cord Venous Blood Gas:  Results from last 7 days   Lab Units 25  0906   PH COV  7.354   PCO2 COV mm HG 46.7*   HCO3 COV mmol/L 25.4   BASE EXC COV mmol/L -0.6*   O2 CT CD VB mL/dL 16.7   O2 HGB, VENOUS CORD % 69.9     Umbilical Cord Arterial Blood Gas:  Results from last 7 days   Lab Units 25  0906   PH COA  7.315   PCO2 COA  49.6   PO2 COA mm HG <10.0   HCO3 COA mmol/L 24.7   BASE EXC COA mmol/L -2.1*   O2 HGB, ARTERIAL CORD % 55.2       Operative Technique  The patient was taken to the operating room. CSE was adequately established and 2g Ancef and 500mg Azithromycin was given for preoperative prophylaxis. The patient was then placed in the dorsal supine position with a left tilt of the hips. The patient was then prepped with chlorhexidine for vaginal prep with a ricardo catheter and chloraprep for abdominal prep and draped in the usual sterile fashion for a Pfannenstiel skin incision.      A time out was performed to confirm correct patient and correct procedure.     An incision was made in the skin with a surgical scalpel and sharp dissection was carried out over subsequent layers of tissue including the fascia. The fascia was incised bilaterally and lateral to midline, and the fascial incision was extended  bilaterally using the curved Barbosa scissors.      The superior edge of the fascial incision was grasped with Kocher clamps, tented up and the underlying rectus muscles were dissected off bluntly and sharply using the scalpel. The same was done inferiorly. The rectus muscles were then divided at midline and the peritoneum was identified and entered using hemostat and scalpel with care taken to enter the peritoneum. The peritoneal incision was extended superiorly and inferiorly.     The Pranav-O retractor was inserted. A bladder flap was made using Metzenbaum scissors and pickups. A transverse incision was made in the lower uterine segment using a new surgical blade above the window. The uterine incision was extended cephalad and caudal using blunt dissection. The amniotic sac was entered.    The surgeon's hand was placed into the uterine cavity. The fetal head was identified and elevated through the uterine incision with the assistance of fundal pressure. With gentle traction, the shoulder was delivered, followed by the rest of the fetal body. There was no nuchal cord noted. On delivery the cord was doubly clamped and cut. The infant was then passed off the table to the awaiting  staff. The  was noted to cry spontaneously and moved all extremities. Venous and arterial blood gas, cord blood, and portion of cord was obtained for analysis and routine blood testing. The placenta delivery was then sent to storage. Placenta was noted to be intact with a centrally inserted three-vessel cord.  Oxytocin was administered by IV infusion to enhance uterine contraction and TXA was administered for additional bleeding notes. The uterus was exteriorized and cleared of all clots and remaining products of conception.      The uterine incision was re approximated using an 0 Vicryl in a running locked fashion. A second horizontal imbricating stitch with 0-Monocryl was applied. The uterine incision was examined and noted  to be hemostatic. The posterior cul-de-sac was cleared of all clots and products of conception. The uterus was replaced into the abdomen and the pericolic gutters were cleared of all clots.  Irrigation revealed no active bleeding. The uterine incision was once again reexamined and noted to be hemostatic.       A tubal timeout was called, and patient confirmed that she wanted fallopian tubes removed. Attention was then turned to the right adnexa. The right fallopian tube was adhered to the anterior abdominal wall, requiring dissection with Enseal. The fallopian tube was grasped and elevated and dissection through the mesosalpinx from fimbriae to cornua was performed using the Enseal device, and tube was ligated 1cm from the cornua. This was repeated on patient left.  Both tubes were then sent for pathology. All pedicles were carefully inspected and noted to be hemostatic.      The Pranav retractor was removed. The fascia was re approximated using an 0 Vicryl in a running nonlocked fashion. The subcutaneous tissue was irrigated and cleared of all clots and debris. Good hemostasis was noted with Bovie electrocautery. The subcutaneous tissue was reapproximated with 2-0 Monocryl. The skin incision was closed using 4-0 Monocryl with Mepilex dressing. Good hemostasis was noted. Patient tolerated the procedure well. All needle, sponge, and instrument counts were noted to be correct x 2 at the end of the procedure.  Patient was transferred to the recovery room in stable condition. Dr. Solo was present and participated in the entire surgery.    Complications:   None    Patient Disposition:  PACU         SIGNATURE: Silvana Castle MD  DATE: February 27, 2025  TIME: 10:21 AM

## 2025-02-27 NOTE — UTILIZATION REVIEW
Continued Stay Review    Date: 25                          Current Patient Class: Inpatient  Current Level of Care: M/S    HPI:39 y.o. female initially admitted on 25 @ 33w 4d with PPROM       Assessment/Plan:  @ 34 weeks  Female  Apgar  8  9   Wt 1925 grams  Baby taken to NICU    Medications:   Scheduled Medications:  amoxicillin, 500 mg, Oral, Q8H LACEY  azithromycin, 500 mg, Intravenous, Once  metroNIDAZOLE, 500 mg, Oral, Q12H LACEY  sodium chloride, 1,000 mL, Intravenous, Once      Continuous IV Infusions:     PRN Meds:  acetaminophen, 975 mg, Oral, Q6H PRN  bupivacaine (PF), 30 mL, Infiltration, Once PRN  HYDROmorphone, 0.5 mg, Intravenous, Q2H PRN  naloxone, 0.1 mg, Intravenous, Q3 min PRN  oxyCODONE, 10 mg, Oral, Q4H PRN  oxyCODONE, 5 mg, Oral, Q4H PRN      Discharge Plan: home when medically stable    Vital Signs (last 3 days)       Date/Time Temp Pulse Resp BP MAP (mmHg) SpO2 O2 Device Cardiac (WDL) Patient Position - Orthostatic VS Pain    25 0526 -- 66 18 100/59 -- -- -- -- -- --    25 0437 98.2 °F (36.8 °C) 72 18 88/55 -- 97 % None (Room air) -- Lying --    25 0010 97.7 °F (36.5 °C) 80 18 89/53 -- 98 % None (Room air) -- Lying --    25 -- -- -- -- -- -- -- WDL -- --    OBSERV: pt reports +fm,  reports + LOF with one pad at this time. denies vaginal bleeding. pt instructed to call with any concerns, questions or changes. pt verbalized understanding. at 25 2046 98.2 °F (36.8 °C) 92 18 94/52 70 98 % None (Room air) -- Lying No Pain    25 1558 98.6 °F (37 °C) 83 18 107/61 77 98 % None (Room air) -- Sitting No Pain    25 1445 -- -- -- -- -- -- -- -- -- --    OBSERV: pt reports +fm, reports feeling a ctx 15 mins prior but reports no further complaints. reports + LOF but denies having any pads at this time. denies vaginal bleeding. pt instructed to call with any concerns, questions or changes. pt verbalized understanding. at  02/26/25 1445    02/26/25 1400 -- -- -- -- -- -- -- -- -- No Pain    02/26/25 1218 97.9 °F (36.6 °C) -- -- -- -- -- -- -- -- --    02/26/25 0858 97.9 °F (36.6 °C) 88 18 107/58 -- 96 % -- -- Sitting --    02/26/25 0814 -- -- -- -- -- -- -- -- -- --    OBSERV: pt reports +fm, reports feeling 2 ctx but reports no further complaints. reports + LOF but denies having any pads at this time. denies vaginal bleeding. pt instructed to call with any concerns, questions or changes. pt verbalized understanding. sonographer at this time is at bedside to do US. at 02/26/25 0814    02/26/25 0800 -- -- -- -- -- -- None (Room air) WDL -- --    02/26/25 0540 97.9 °F (36.6 °C) 75 18 95/56 67 98 % None (Room air) -- Lying No Pain    02/25/25 2251 98.1 °F (36.7 °C) 79 18 102/51 -- 98 % -- -- -- --    02/25/25 2046 97.8 °F (36.6 °C) -- -- -- -- -- None (Room air) WDL -- No Pain    OBSERV: pt reports positive fetal movement; Denies feeling contractions, abdominal discomfort or tenderness; Denies vaginal bleeding at 02/25/25 2046 02/25/25 1625 98.6 °F (37 °C) 86 18 94/65 70 100 % None (Room air) -- Lying No Pain    02/25/25 1254 -- 94 20 107/54 -- -- -- -- -- --    02/25/25 1235 98.9 °F (37.2 °C) 96 20 154/98 -- 99 % -- -- -- No Pain    02/25/25 0912 98 °F (36.7 °C) 100 20 98/57 -- 98 % -- -- -- No Pain    02/25/25 0813 -- -- -- -- -- -- None (Room air) WDL -- --    OBSERV: pt reports positive fetal movement; denies feeling contractions, abdominal discomfort or tenderness; denies vaginal bleeding. at 02/25/25 0813 02/25/25 0605 -- -- -- 90/64 -- -- -- -- -- --    02/25/25 0356 97.8 °F (36.6 °C) 89 18 101/60 74 97 % None (Room air) -- Sitting 3    02/25/25 0040 -- -- -- -- -- -- -- -- -- 8    02/25/25 0002 98.2 °F (36.8 °C) 68 18 90/53 65 95 % None (Room air) -- Lying No Pain    02/24/25 2037 97.4 °F (36.3 °C) 96 20 100/51 -- 95 % None (Room air) -- Lying No Pain    02/24/25 1933 -- -- -- -- -- -- None (Room air) WDL -- No Pain     OBSERV: pt reports positive fetal movement; denies feeling contractions, abdominal discomfort or tenderness; denies vaginal bleeding. at 02/24/25 1933    02/24/25 1834 -- 90 -- 109/62 -- -- -- -- -- --    02/24/25 1554 97.7 °F (36.5 °C) 105 20 106/58 -- 95 % None (Room air) -- Lying No Pain    02/24/25 1500 -- -- -- -- -- -- -- -- -- --    OBSERV: pt. denies contractions at this time at 02/24/25 1500    02/24/25 1242 -- 92 -- 100/57 -- -- -- -- -- --    02/24/25 1202 97.6 °F (36.4 °C) 95 18 104/58 -- 96 % None (Room air) -- Sitting 4    02/24/25 0818 98.3 °F (36.8 °C) 91 18 95/54 -- 95 % None (Room air) WDL Sitting 5    OBSERV: pt. reports positive fetal movement; mild contractions; continues to monitor pad counts; instructed to call for any signs/symptoms of labor at 02/24/25 0818    02/24/25 0639 -- -- -- 100/57 -- -- -- -- -- --    02/24/25 0430 -- -- -- -- -- -- -- WDL -- --    02/24/25 0423 98.2 °F (36.8 °C) 98 18 100/59 74 97 % None (Room air) -- Sitting 5    02/24/25 0309 98.4 °F (36.9 °C) 95 18 100/57 -- 96 % -- -- Lying 5    02/24/25 0015 98.4 °F (36.9 °C) 92 16 111/59 -- -- -- -- -- 5          Weight (last 2 days)       Date/Time    02/26/25 2319    Comment rows:    OBSERV: pt reports +fm,  reports + LOF with one pad at this time. denies vaginal bleeding. pt instructed to call with any concerns, questions or changes. pt verbalized understanding. at 02/26/25 2319 02/26/25 1445    Comment rows:    OBSERV: pt reports +fm, reports feeling a ctx 15 mins prior but reports no further complaints. reports + LOF but denies having any pads at this time. denies vaginal bleeding. pt instructed to call with any concerns, questions or changes. pt verbalized understanding. at 02/26/25 1445    02/26/25 0814    Comment rows:    OBSERV: pt reports +fm, reports feeling 2 ctx but reports no further complaints. reports + LOF but denies having any pads at this time. denies vaginal bleeding. pt instructed to call with any  concerns, questions or changes. pt verbalized understanding. sonographer at this time is at bedside to do US. at 02/26/25 0814    02/25/25 2046    Comment rows:    OBSERV: pt reports positive fetal movement; Denies feeling contractions, abdominal discomfort or tenderness; Denies vaginal bleeding at 02/25/25 2046 02/25/25 0813    Comment rows:    OBSERV: pt reports positive fetal movement; denies feeling contractions, abdominal discomfort or tenderness; denies vaginal bleeding. at 02/25/25 0813            Pertinent Labs/Diagnostic Results:             Results from last 7 days   Lab Units 02/27/25  0624 02/24/25  0021   WBC Thousand/uL 10.60* 9.42   HEMOGLOBIN g/dL 10.9* 10.7*   HEMATOCRIT % 33.6* 33.1*   PLATELETS Thousands/uL 484* 459*   TOTAL NEUT ABS Thousands/µL  --  6.30         Results from last 7 days   Lab Units 02/27/25  0814   UNIT PRODUCT CODE  [id number]  [id number]  [id number]  [id number]   UNIT NUMBER  [id number]  [id number]  [id number]  [id number]   UNITABO  O  O  O  O   UNITRH  POS  POS  POS  POS   CROSSMATCH  Compatible  Compatible  Compatible  Compatible   UNIT DISPENSE STATUS  Crossmatched  Crossmatched  Crossmatched  Crossmatched   UNIT PRODUCT VOL ml 350  350  300  275                         Results from last 7 days   Lab Units 02/20/25  1115   GLUCOSE UA  negative   PROTEIN UA  positive                                 Results from last 7 days   Lab Units 02/24/25  0340   URINE CULTURE  No Growth <1000 cfu/mL                   Network Utilization Review Department  ATTENTION: Please call with any questions or concerns to [phone] and carefully listen to the prompts so that you are directed to the right person. All voicemails are confidential.   For Discharge needs, contact Care Management DC Support Team at [phone] opt. 2  Send all requests for admission clinical reviews, approved or denied determinations and any other requests to dedicated fax number  below belonging to the campus where the patient is receiving treatment. List of dedicated fax numbers for the Facilities:  FACILITY NAME UR FAX NUMBER   ADMISSION DENIALS (Administrative/Medical Necessity) [phone]   DISCHARGE SUPPORT TEAM (NETWORK) [phone]   PARENT CHILD HEALTH (Maternity/NICU/Pediatrics) [phone]   [facility] [phone]   [facility] [phone]   [facility] [phone]   [facility] [phone]   [facility] [phone]   [facility] [phone]   [facility] [phone]   [facility] [phone]   [facility] [phone]   [facility] [phone]   [facility] [phone]   [facility] [phone]

## 2025-02-27 NOTE — ASSESSMENT & PLAN NOTE
SVE 1/50/-3 and unchanged at 2 hr recheck on admission  S/P procardia 30mg bolus, discontinued thereafter  [place]: contraction Q 3-4     Plan:   Reg diet  See plan under PPROM

## 2025-02-27 NOTE — ANESTHESIA PREPROCEDURE EVALUATION
Procedure:   SECTION () REPEAT (Uterus)    Relevant Problems   GYN   (+) 33 weeks gestation of pregnancy   (+) Antepartum multigravida of advanced maternal age      MUSCULOSKELETAL   (+) Scoliosis       Latest Reference Range & Units 25 06:24   WBC 4.31 - 10.16 Thousand/uL 10.60 (H)   RBC 3.81 - 5.12 Million/uL 3.93   Hemoglobin 11.5 - 15.4 g/dL 10.9 (L)   Hematocrit 34.8 - 46.1 % 33.6 (L)   MCV 82 - 98 fL 86   MCH 26.8 - 34.3 pg 27.7   MCHC 31.4 - 37.4 g/dL 32.4   RDW 11.6 - 15.1 % 13.4   Platelet Count 149 - 390 Thousands/uL 484 (H)   MPV 8.9 - 12.7 fL 9.1   (H): Data is abnormally high  (L): Data is abnormally low  Physical Exam    Airway    Mallampati score: III  TM Distance: >3 FB  Neck ROM: full     Dental        Cardiovascular      Pulmonary      Other Findings  post-pubertal.      Anesthesia Plan  ASA Score- 2     Anesthesia Type- epidural with ASA Monitors.         Additional Monitors:     Airway Plan:     Comment: Plan for CSE as this is the patient's 4th  and she also desires permanent sterilization. .       Plan Factors-Exercise tolerance (METS): >4 METS.    Chart reviewed.   Existing labs reviewed. Patient summary reviewed.      Patient did not smoke on day of surgery.            Induction-     Postoperative Plan- Plan for postoperative opioid use.     Perioperative Resuscitation Plan - Level 1 - Full Code.       Informed Consent- Anesthetic plan and risks discussed with patient.  I personally reviewed this patient with the CRNA. Discussed and agreed on the Anesthesia Plan with the CRNA..      NPO Status:  Vitals Value Taken Time   Date of last liquid 25 0015   Time of last liquid 2355 25 0015   Date of last solid 25 0015   Time of last solid 2100 25 0015

## 2025-02-27 NOTE — H&P
Interim H & P-  OBGYN  Yvette Siddiqui 39 y.o. female MRN: [id number]  Unit/Bed#: -01 Encounter: [id number]    Assessment: 39 y.o.  at 34w0d admitted for PPROM and RLTCS with bilateral salpingectomy today  SVE: Cervical Dilation: 1  Cervical Effacement: 50  Cervical Consistency: Medium  Fetal Station: -3  Position: Unknown  Method: Manual  OB Examiner: tenisha      Plan:   Please see progress note for full plan        Discussed case and plan w/ Dr. Solo      Chief Complaint: leakage of fluid    HPI: Yvette Siddiqui is a 39 y.o.  with an ANA of 4/10/2025, by Last Menstrual Period at 34w0d who was admitted for PPROM. Has had some leakage of fluid and contractions. Denies vaginal bleeding and feels fetal movement.    Patient Active Problem List   Diagnosis    Antepartum multigravida of advanced maternal age    Gonorrhea    33 weeks gestation of pregnancy    Encounter for consultation for female sterilization    Encounter for screening for maternal depression    History of  delivery     premature rupture of membranes (PPROM)    Vaginitis     contractions       Baby complications/comments: none    Review of Systems   Constitutional:  Negative for chills and fever.   Respiratory:  Negative for cough and shortness of breath.    Cardiovascular:  Negative for chest pain and leg swelling.   Gastrointestinal:  Negative for abdominal pain, nausea and vomiting.   Genitourinary:  Negative for dysuria, pelvic pain, urgency, vaginal bleeding and vaginal discharge.   Neurological:  Negative for dizziness, light-headedness and headaches.   All other systems reviewed and are negative.      OB Hx:  OB History    Para Term  AB Living   5 3 3  1 3   SAB IAB Ectopic Multiple Live Births   1    3      # Outcome Date GA Lbr Edmundo/2nd Weight Sex Type Anes PTL Lv   5 Current            4 Term 19 39w0d  3175 g (7 lb) F CS-LTranv Spinal N PRISCILLA      Birth Comments: RCS   3 2010  8w0d             Birth Comments: naturally- procedure   2 Term 08 39w0d  3175 g (7 lb) F CS-Unspec Spinal Y PRISCILLA      Birth Comments: RCS   1 Term 07 40w0d  3515 g (7 lb 12 oz) M CS-LTranv Spinal N PRISCILLA      Birth Comments: C/s for fetal intolerence      Complications: Fetal Intolerance       Past Medical Hx:  History reviewed. No pertinent past medical history.    Past Surgical hx:  Past Surgical History:   Procedure Laterality Date    APPENDECTOMY       SECTION      GALLBLADDER SURGERY      KNEE SURGERY Right        Social Hx:  Alcohol use: no  Tobacco use: no  Other substance use: no    No Known Allergies    No medications prior to admission.    Objective:  Temp:  [97.7 °F (36.5 °C)-98.6 °F (37 °C)] 98.2 °F (36.8 °C)  HR:  [66-92] 66  BP: ()/(52-61) 100/59  Resp:  [18] 18  SpO2:  [96 %-98 %] 97 %  O2 Device: None (Room air)  Body mass index is 29.94 kg/m².     Physical Exam:  Physical Exam  Constitutional:       Appearance: Normal appearance.   Cardiovascular:      Rate and Rhythm: Normal rate and regular rhythm.      Heart sounds: No murmur heard.     No friction rub. No gallop.   Pulmonary:      Effort: Pulmonary effort is normal. No respiratory distress.      Breath sounds: No wheezing.   Abdominal:      Palpations: Abdomen is soft.      Tenderness: There is no abdominal tenderness.   Musculoskeletal:         General: No swelling or tenderness.   Neurological:      Mental Status: She is alert and oriented to person, place, and time.   Skin:     General: Skin is warm and dry.   Psychiatric:         Mood and Affect: Mood normal.   Vitals reviewed.            FHT:  Baseline Rate (FHR): 135 bpm  Variability: Moderate  Accelerations: 15 x 15 or greater, Episodic, At variable times  Decelerations: None  FHR Category: Category I    TOCO:   Contraction Frequency (minutes): 2X  Contraction Duration (seconds): 60-80  Contraction Intensity: Mild    Lab Results   Component Value Date    WBC 9.42  02/24/2025    HGB 10.7 (L) 02/24/2025    HCT 33.1 (L) 02/24/2025     (H) 02/24/2025     Lab Results   Component Value Date    K 3.7 12/27/2024     12/27/2024    CO2 25 12/27/2024    BUN 8 12/27/2024    CREATININE 0.51 12/27/2024    AST 17 12/27/2024    ALT 21 12/27/2024     Prenatal Labs: Reviewed      Bloodtype:O  Rh Factor (no units)   Date Value   02/24/2025 Positive     Strep Grp B PCR (no units)   Date Value   02/24/2025 Negative     HIV-1/HIV-2 AB (no units)   Date Value   11/25/2024 Non-Reactive       Syphilis Total Antibody (no units)   Date Value   11/25/2024 non reactive     Hepatitis B Surface Ag (no units)   Date Value   11/25/2024 non reactive     Chlamydia trachomatis, DNA Probe (no units)   Date Value   02/24/2025 Negative     N gonorrhoeae, DNA Probe (no units)   Date Value   02/24/2025 Negative   Rubella Immune  1hr glucola 113  Platelets (Thousands/uL)   Date Value   02/24/2025 459 (H)     Hemoglobin (g/dL)   Date Value   02/24/2025 10.7 (L)     >2 Midnights  INPATIENT       Signature/Title: Silvana Castle MD  Date: 2/27/2025  Time: 6:29 AM

## 2025-02-27 NOTE — ANESTHESIA PROCEDURE NOTES
CSE Block    Patient location during procedure: OB  Start time: 2/27/2025 8:41 AM  Staffing  Performed by: Alicia Gil MD  Authorized by: Alicia Gil MD    Preanesthetic Checklist  Completed: patient identified, IV checked, risks and benefits discussed, surgical consent, monitors and equipment checked, pre-op evaluation and timeout performed  CSE  Patient position: sitting  Prep: ChloraPrep  Patient monitoring: continuous pulse ox and frequent blood pressure checks  Approach: midline  Spinal Needle  Needle type: pencil-tip   Needle gauge: 27 G  Needle length: 13 cm  Needle insertion depth: 6.5 cm  Epidural Needle  Injection technique: GRISELDA air  Needle type: Tuohy   Needle gauge: 18 G  Needle length: 9 cm  Needle insertion depth: 6 cm  Location: L4-L5  Catheter  Catheter type: side hole  Catheter size: 20 G  Catheter at skin depth: 13 cm  Catheter securement method: clear occlusive dressing  Assessment  Sensory level: T6  Injection Assessment:  no pain on injection, no paresthesia on injection and positive aspiration for clear CSF.

## 2025-02-27 NOTE — ASSESSMENT & PLAN NOTE
Presented to triage at 33w3d w/  complaint of leakage of fluid and contractions  Pelvic exam : grossly ruptured with positive pooling, nitrazine positive, negative ferning  Wet mt/microscopy: positive for clue cells, budding yeast  Otherwise hemodynamically stable with no signs of chorioamnionitis , fundal tenderness, or fever  No signs of abruption   S/p loading dose of Procardia, tocolysis held thereafter  Speculum exam on 2/25 not concerning for infection or labor    Plan:   - Latency antibiotics x 7 days: 1g Azithromycin x 1 + 2g Ampicillin Q6h IV x 48 hours followed by Amoxicillin 500mg PO TID for 5 days (until 3/2)  - Betamethasone for fetal lung maturation: 2/24-2/25  - Infectious workup: GBS neg, Urine culture neg, GC negative on 2/24  - Delivery today via RLTCS and bilateral salpingectomy, NPO since midnight, 2g Ancef, 500mg Azithromycin

## 2025-02-28 PROBLEM — Z90.79 STATUS POST BILATERAL SALPINGECTOMY: Status: ACTIVE | Noted: 2024-12-27

## 2025-02-28 PROBLEM — O09.529 ANTEPARTUM MULTIGRAVIDA OF ADVANCED MATERNAL AGE: Status: RESOLVED | Noted: 2024-11-27 | Resolved: 2025-02-28

## 2025-02-28 LAB
DME PARACHUTE DELIVERY DATE ACTUAL: NORMAL
DME PARACHUTE DELIVERY DATE REQUESTED: NORMAL
DME PARACHUTE ITEM DESCRIPTION: NORMAL
DME PARACHUTE ORDER STATUS: NORMAL
DME PARACHUTE SUPPLIER NAME: NORMAL
DME PARACHUTE SUPPLIER PHONE: NORMAL
ERYTHROCYTE [DISTWIDTH] IN BLOOD BY AUTOMATED COUNT: 13.2 % (ref 11.6–15.1)
ERYTHROCYTE [DISTWIDTH] IN BLOOD BY AUTOMATED COUNT: 13.3 % (ref 11.6–15.1)
HCT VFR BLD AUTO: 25.8 % (ref 34.8–46.1)
HCT VFR BLD AUTO: 27.3 % (ref 34.8–46.1)
HGB BLD-MCNC: 8.3 G/DL (ref 11.5–15.4)
HGB BLD-MCNC: 8.7 G/DL (ref 11.5–15.4)
MCH RBC QN AUTO: 27.4 PG (ref 26.8–34.3)
MCH RBC QN AUTO: 27.5 PG (ref 26.8–34.3)
MCHC RBC AUTO-ENTMCNC: 31.9 G/DL (ref 31.4–37.4)
MCHC RBC AUTO-ENTMCNC: 32.2 G/DL (ref 31.4–37.4)
MCV RBC AUTO: 85 FL (ref 82–98)
MCV RBC AUTO: 86 FL (ref 82–98)
PLATELET # BLD AUTO: 349 THOUSANDS/UL (ref 149–390)
PLATELET # BLD AUTO: 377 THOUSANDS/UL (ref 149–390)
PMV BLD AUTO: 9 FL (ref 8.9–12.7)
PMV BLD AUTO: 9.3 FL (ref 8.9–12.7)
RBC # BLD AUTO: 3.02 MILLION/UL (ref 3.81–5.12)
RBC # BLD AUTO: 3.18 MILLION/UL (ref 3.81–5.12)
WBC # BLD AUTO: 10.24 THOUSAND/UL (ref 4.31–10.16)
WBC # BLD AUTO: 9.57 THOUSAND/UL (ref 4.31–10.16)

## 2025-02-28 PROCEDURE — 85027 COMPLETE CBC AUTOMATED: CPT

## 2025-02-28 PROCEDURE — 99024 POSTOP FOLLOW-UP VISIT: CPT | Performed by: OBSTETRICS & GYNECOLOGY

## 2025-02-28 RX ADMIN — METRONIDAZOLE 500 MG: 500 TABLET ORAL at 08:25

## 2025-02-28 RX ADMIN — IBUPROFEN 600 MG: 600 TABLET, FILM COATED ORAL at 23:31

## 2025-02-28 RX ADMIN — METRONIDAZOLE 500 MG: 500 TABLET ORAL at 20:40

## 2025-02-28 RX ADMIN — IRON SUCROSE 200 MG: 20 INJECTION, SOLUTION INTRAVENOUS at 08:25

## 2025-02-28 RX ADMIN — ACETAMINOPHEN 975 MG: 325 TABLET, FILM COATED ORAL at 08:25

## 2025-02-28 RX ADMIN — DOCUSATE SODIUM 100 MG: 100 CAPSULE, LIQUID FILLED ORAL at 08:24

## 2025-02-28 RX ADMIN — ACETAMINOPHEN 975 MG: 325 TABLET, FILM COATED ORAL at 03:03

## 2025-02-28 RX ADMIN — KETOROLAC TROMETHAMINE 15 MG: 30 INJECTION, SOLUTION INTRAMUSCULAR; INTRAVENOUS at 06:20

## 2025-02-28 RX ADMIN — IBUPROFEN 600 MG: 600 TABLET, FILM COATED ORAL at 14:19

## 2025-02-28 RX ADMIN — DOCUSATE SODIUM 100 MG: 100 CAPSULE, LIQUID FILLED ORAL at 19:08

## 2025-02-28 RX ADMIN — ACETAMINOPHEN 975 MG: 325 TABLET, FILM COATED ORAL at 14:19

## 2025-02-28 RX ADMIN — ACETAMINOPHEN 975 MG: 325 TABLET, FILM COATED ORAL at 20:40

## 2025-02-28 NOTE — ASSESSMENT & PLAN NOTE
, s/p TXA, Hgb 10.9 ->  8.4, venofer -> repeat noon CBC  Passed void trial  Pain: Tylenol and toradol scheduled, junaid 5/10 PRN    FEN: Tolerating regular diet  DVT ppx: SCDs   Passing flatus   Incision C/D/I

## 2025-02-28 NOTE — LACTATION NOTE
CONSULT - LACTATION  Yvette Siddiqui 39 y.o. female MRN: [id number]    [facility] AN L&D Room / Bed:  324/ 324-01 Encounter: [id number]    Maternal Information     MOTHER:  N/A  Maternal Age: This patient's mother is not on file.  OB History: This patient's mother is not on file.  Previous breast reduction surgery? No    Lactation history:   Has patient previously breast fed: Yes   How long had patient previously breast fed: 3 months with first, 9 months with second and 5 years with third.   Previous breast feeding complications: None   This patient's mother is not on file.    Birth information:  YOB: 1985   Time of birth:     Sex: female   Delivery type:     Birth Weight: No birth weight on file.   Percent of Weight Change: Birth weight not on file     Gestational Age: <None>   [unfilled]    Reason for Consult:    Reason for Consult: Initial assessment (ext) - 20 min    Risk Factors:         Breast and nipple assessment:   Breasts/Nipples  Date Pumping Initiated: 25  Left Breast: Soft  Right Breast: Soft  Left Nipple: Everted  Right Nipple: Everted  Intervention: Hand expression, Breast pump  Breastfeeding Status: Yes  Breastfeeding Progress: Pumping only  Reasons for not Breastfeeding: Infant medical condition    OB Lactation Tools:    Other OB Lactation Tools  Feeding Devices: Pump    Breast Pump:    Breast Pump  Pump: Personal (CM Consult for Spectra S2)  Pump Review/Education: Setup, frequency, and cleaning, Milk storage  Initiated by: nursing staff  Date Initiated: 25      Huslia Assessment:  baby in NICU      Feeding recommendations:  pump every 2-3 hours    Initial: Experienced BF mom. Has only pumped 1x since delivery for baby in NICU.     Education given to mom on establishing milk supply. Reviewed how to cycle through a pumping session. Encouraged to pump every 2-3 hours. Education given on colostrum, transitional milk and mature milk.  Encouraged mom to use other senses when not with baby. Encouraged mom to pump in NICU when visiting baby. Reviewed breast milk storage guidelines. Enc to call for further assistance.     Set alarms to pump every 2 hrs during the day and every 3 hrs at night  Use massage, warmth, & hand expression to stimulate glandular tissue prior to pumping.  May use nipple cream/butter/oil where tunnel and funnel meet on flange to assist movement of breast tissue inside the flange  Use breast compressions, hands on pumping techniques to assist in expressing milk    Cycle pumping - Begin the pump in stimulation mode. Once milk is visible in the tunnel of the flange, change pump setting to expression mode. Once milk is NOT seen in the tunnel, cycle back to stimulation mode.Continue cycle pumping until end of feeding.    Pump both breasts simultaneously  Use all 5 senses when pumping to increase milk transfer.  Store expressed milk or feed expressed milk via syringe, NG tube or paced bottle feeding method.   Continue to hand express between feeds to stimulate the breasts frequently    Review Milkmob on youtube or scan QR code for MilkMob video  When with baby, place baby skin to skin. Attempt to latch when medically cleared.         Milk Mob       Janice Cook MA, IBCLC  2/28/2025 3:49 PM

## 2025-02-28 NOTE — CASE MANAGEMENT
Case Management Progress Note    Patient name Yvette Siddiqui  Location /-01 MRN [id number]  : 1985 Date 2025       LOS (days): 4  Geometric Mean LOS (GMLOS) (days):   Days to GMLOS:        OBJECTIVE:        Current admission status: Inpatient  Preferred Pharmacy:   [id number] [place] [address]  [address] [place] [address]  Phone: [phone] Fax: [phone]    Primary Care Provider: No primary care provider on file.    Primary Insurance: BLUE CROSS  Secondary Insurance:     PROGRESS NOTE:      CM met with MOB to introduce CM services, complete assessment, and provide CM contact info.    MOB had  present and verbalized agreement with personal interview with them present.    MOB reported the following:    Assessment:  Consult reason: NICU Admission  Gestational Age at Birth: 34 Weeks + 0 Days  MOB Name (& age if teen):   Yvette Siddiqui  FOB Name (& age if teen MOB):  Kang Merino    Other Legal Guardian(s) for Baby:   n/a   Other Children: 3 other children (18, 17, 6)    Housing Plan/Lives with:   MOB FOB and children  Insurance Coverage/Plan for Baby: MOB verbalizes that they will contact FOB's insurance to add baby ASAP.  Support System: Family, Friends, and Spouse/Significant Other  Care Items: Car Seat, Crib/Bassinet (Safe Sleep Space), Diapers/Wipes, and Clothing  Method of Feeding: Breast Feeding  Breast Pump: Breast pump obtained prior to admission  Government Assistance Programs: None   Arrangements: MOB  Current Employment/Schooling: MOB staying home with baby and FOB employed Full Time  Mental Health History and/or Treatment:   None reported   Substance Use History and/or Treatment:  None reported    Urine Drug Screen Results: Not Applicable  Children & Youth History: None  Current Legal Issues: N/A  Domestic/Intimate Partner Violence History: Denies.  NICU Resources: NICU Packet Provided    Discharge  Plan:  Pediatrician:   SL McDonald   Prenatal/ Care:  [facility]   Follow-Up Appointments Needed/Scheduled: TBD  Medications/DME/Other Referrals: TBD  Transportation Plan: FOB has a vehicle    Follow-Up Needed from Care Management:        CM met with parents at bedside, provided NICU packet, no current needs. CM will follow through baby's discharge.

## 2025-02-28 NOTE — PLAN OF CARE
Problem: PAIN - ADULT  Goal: Verbalizes/displays adequate comfort level or baseline comfort level  Description: Interventions:  - Encourage patient to monitor pain and request assistance  - Assess pain using appropriate pain scale  - Administer analgesics based on type and severity of pain and evaluate response  - Implement non-pharmacological measures as appropriate and evaluate response  - Consider cultural and social influences on pain and pain management  - Notify physician/advanced practitioner if interventions unsuccessful or patient reports new pain  Outcome: Progressing     Problem: INFECTION - ADULT  Goal: Absence or prevention of progression during hospitalization  Description: INTERVENTIONS:  - Assess and monitor for signs and symptoms of infection  - Monitor lab/diagnostic results  - Monitor all insertion sites, i.e. indwelling lines, tubes, and drains  - Monitor endotracheal if appropriate and nasal secretions for changes in amount and color  - [place] appropriate cooling/warming therapies per order  - Administer medications as ordered  - Instruct and encourage patient and family to use good hand hygiene technique  - Identify and instruct in appropriate isolation precautions for identified infection/condition  Outcome: Progressing  Goal: Absence of fever/infection during neutropenic period  Description: INTERVENTIONS:  - Monitor WBC    Outcome: Progressing     Problem: SAFETY ADULT  Goal: Patient will remain free of falls  Description: INTERVENTIONS:  - Educate patient/family on patient safety including physical limitations  - Instruct patient to call for assistance with activity   - Consult OT/PT to assist with strengthening/mobility   - Keep Call bell within reach  - Keep bed low and locked with side rails adjusted as appropriate  - Keep care items and personal belongings within reach  - Initiate and maintain comfort rounds  - Make Fall Risk Sign visible to staff  - Apply yellow socks and bracelet  for high fall risk patients  - Consider moving patient to room near nurses station  Outcome: Progressing  Goal: Maintain or return to baseline ADL function  Description: INTERVENTIONS:  -  Assess patient's ability to carry out ADLs; assess patient's baseline for ADL function and identify physical deficits which impact ability to perform ADLs (bathing, care of mouth/teeth, toileting, grooming, dressing, etc.)  - Assess/evaluate cause of self-care deficits   - Assess range of motion  - Assess patient's mobility; develop plan if impaired  - Assess patient's need for assistive devices and provide as appropriate  - Encourage maximum independence but intervene and supervise when necessary  - Involve family in performance of ADLs  - Assess for home care needs following discharge   - Consider OT consult to assist with ADL evaluation and planning for discharge  - Provide patient education as appropriate  Outcome: Progressing  Goal: Maintains/Returns to pre admission functional level  Description: INTERVENTIONS:  - Perform AM-PAC 6 Click Basic Mobility/ Daily Activity assessment daily.  - Set and communicate daily mobility goal to care team and patient/family/caregiver.   - Collaborate with rehabilitation services on mobility goals if consulted  - Out of bed for toileting  - Record patient progress and toleration of activity level   Outcome: Progressing     Problem: DISCHARGE PLANNING  Goal: Discharge to home or other facility with appropriate resources  Description: INTERVENTIONS:  - Identify barriers to discharge w/patient and caregiver  - Arrange for needed discharge resources and transportation as appropriate  - Identify discharge learning needs (meds, wound care, etc.)  - Arrange for interpretive services to assist at discharge as needed  - Refer to Case Management Department for coordinating discharge planning if the patient needs post-hospital services based on physician/advanced practitioner order or complex needs  related to functional status, cognitive ability, or social support system  Outcome: Progressing     Problem: Knowledge Deficit  Goal: Patient/family/caregiver demonstrates understanding of disease process, treatment plan, medications, and discharge instructions  Description: Complete learning assessment and assess knowledge base.  Interventions:  - Provide teaching at level of understanding  - Provide teaching via preferred learning methods  Outcome: Progressing     Problem: POSTPARTUM  Goal: Experiences normal postpartum course  Description: INTERVENTIONS:  - Monitor maternal vital signs  - Assess uterine involution and lochia  Outcome: Progressing  Goal: Appropriate maternal -  bonding  Description: INTERVENTIONS:  - Identify family support  - Assess for appropriate maternal/infant bonding   -Encourage maternal/infant bonding opportunities  - Referral to  or  as needed  Outcome: Progressing  Goal: Establishment of infant feeding pattern  Description: INTERVENTIONS:  - Assess breast/bottle feeding  - Refer to lactation as needed  Outcome: Progressing  Goal: Incision(s), wounds(s) or drain site(s) healing without S/S of infection  Description: INTERVENTIONS  - Assess and document dressing, incision, wound bed, drain sites and surrounding tissue  - Provide patient and family education  - Perform skin care/dressing changes every  Outcome: Progressing

## 2025-02-28 NOTE — PROGRESS NOTES
Progress Note - OB/GYN   Name: Yvette Siddiqui 39 y.o. female I MRN: [id number]  Unit/Bed#: -01 I Date of Admission: 2025   Date of Service: 2025 I Hospital Day: 4     Assessment & Plan  Gonorrhea  Hx of gonorrhea tx 2024  Negative HANK on 24  GC collected on admission   Status post bilateral salpingectomy  Performed at time of cesaraen  S/P repeat low transverse   , s/p TXA, Hgb 10.9 ->  8.4, venofer -> repeat noon CBC  Passed void trial  Pain: Tylenol and toradol scheduled, junaid 5/10 PRN    FEN: Tolerating regular diet  DVT ppx: SCDs   Passing flatus   Incision C/D/I     Vaginitis  Hx of recurrent bacterial vaginosis   Clue cells on wet mt on admission  as well as budding yeast  Scoliosis      OB Post-Partum Progress Note  Subjective   Post delivery. Patient is doing well. Lochia WNL. Pain well controlled. She did feel slightly dizzy after her  yesterday and now intermittently.    Pain: yes, cramping, improved with meds  Tolerating PO: yes  Voiding: yes  Flatus: yes  BM: no  Ambulating: yes  Breastfeeding:  baby in  NICU  Chest pain: no  Shortness of breath: no  Leg pain: no  Lochia: WNL    Objective :  Temp:  [96.6 °F (35.9 °C)-98.6 °F (37 °C)] 97.7 °F (36.5 °C)  HR:  [63-76] 66  BP: ()/(52-67) 98/55  Resp:  [16-20] 18  SpO2:  [95 %-100 %] 99 %  O2 Device: None (Room air)    Physical Exam  GEN: The patient was alert and oriented x3, pleasant well-appearing female in no acute distress.   CV: Regular rate  PULM: nonlabored respirations  MSK: Normal gait  ABD: Mepilex overlying incision C/D/I, minimal tenderness to palpation  Skin: warm, dry  Neuro: no focal deficits  Psych: normal affect and judgement, cooperative      Lab Results: I have reviewed the following results:  Lab Results   Component Value Date    WBC 9.57 2025    HGB 8.3 (L) 2025    HCT 25.8 (L) 2025    MCV 85 2025     2025     Silvana ROBBINS PGY3

## 2025-02-28 NOTE — PLAN OF CARE
Problem: PAIN - ADULT  Goal: Verbalizes/displays adequate comfort level or baseline comfort level  Description: Interventions:  - Encourage patient to monitor pain and request assistance  - Assess pain using appropriate pain scale  - Administer analgesics based on type and severity of pain and evaluate response  - Implement non-pharmacological measures as appropriate and evaluate response  - Consider cultural and social influences on pain and pain management  - Notify physician/advanced practitioner if interventions unsuccessful or patient reports new pain  Outcome: Progressing     Problem: INFECTION - ADULT  Goal: Absence or prevention of progression during hospitalization  Description: INTERVENTIONS:  - Assess and monitor for signs and symptoms of infection  - Monitor lab/diagnostic results  - Monitor all insertion sites, i.e. indwelling lines, tubes, and drains  - Monitor endotracheal if appropriate and nasal secretions for changes in amount and color  - [place] appropriate cooling/warming therapies per order  - Administer medications as ordered  - Instruct and encourage patient and family to use good hand hygiene technique  - Identify and instruct in appropriate isolation precautions for identified infection/condition  Outcome: Progressing  Goal: Absence of fever/infection during neutropenic period  Description: INTERVENTIONS:  - Monitor WBC    Outcome: Progressing     Problem: SAFETY ADULT  Goal: Patient will remain free of falls  Description: INTERVENTIONS:  - Educate patient/family on patient safety including physical limitations  - Instruct patient to call for assistance with activity   - Consult OT/PT to assist with strengthening/mobility   - Keep Call bell within reach  - Keep bed low and locked with side rails adjusted as appropriate  - Keep care items and personal belongings within reach  - Initiate and maintain comfort rounds  - Make Fall Risk Sign visible to staff  - Apply yellow socks and bracelet  for high fall risk patients  - Consider moving patient to room near nurses station  Outcome: Progressing  Goal: Maintain or return to baseline ADL function  Description: INTERVENTIONS:  -  Assess patient's ability to carry out ADLs; assess patient's baseline for ADL function and identify physical deficits which impact ability to perform ADLs (bathing, care of mouth/teeth, toileting, grooming, dressing, etc.)  - Assess/evaluate cause of self-care deficits   - Assess range of motion  - Assess patient's mobility; develop plan if impaired  - Assess patient's need for assistive devices and provide as appropriate  - Encourage maximum independence but intervene and supervise when necessary  - Involve family in performance of ADLs  - Assess for home care needs following discharge   - Consider OT consult to assist with ADL evaluation and planning for discharge  - Provide patient education as appropriate  Outcome: Progressing  Goal: Maintains/Returns to pre admission functional level  Description: INTERVENTIONS:  - Perform AM-PAC 6 Click Basic Mobility/ Daily Activity assessment daily.  - Set and communicate daily mobility goal to care team and patient/family/caregiver.   - Collaborate with rehabilitation services on mobility goals if consulted  - Out of bed for toileting  - Record patient progress and toleration of activity level   Outcome: Progressing     Problem: DISCHARGE PLANNING  Goal: Discharge to home or other facility with appropriate resources  Description: INTERVENTIONS:  - Identify barriers to discharge w/patient and caregiver  - Arrange for needed discharge resources and transportation as appropriate  - Identify discharge learning needs (meds, wound care, etc.)  - Arrange for interpretive services to assist at discharge as needed  - Refer to Case Management Department for coordinating discharge planning if the patient needs post-hospital services based on physician/advanced practitioner order or complex needs  related to functional status, cognitive ability, or social support system  Outcome: Progressing     Problem: Knowledge Deficit  Goal: Patient/family/caregiver demonstrates understanding of disease process, treatment plan, medications, and discharge instructions  Description: Complete learning assessment and assess knowledge base.  Interventions:  - Provide teaching at level of understanding  - Provide teaching via preferred learning methods  Outcome: Progressing     Problem: POSTPARTUM  Goal: Experiences normal postpartum course  Description: INTERVENTIONS:  - Monitor maternal vital signs  - Assess uterine involution and lochia  Outcome: Progressing  Goal: Appropriate maternal -  bonding  Description: INTERVENTIONS:  - Identify family support  - Assess for appropriate maternal/infant bonding   -Encourage maternal/infant bonding opportunities  - Referral to  or  as needed  Outcome: Progressing  Goal: Establishment of infant feeding pattern  Description: INTERVENTIONS:  - Assess breast/bottle feeding  - Refer to lactation as needed  Outcome: Progressing  Goal: Incision(s), wounds(s) or drain site(s) healing without S/S of infection  Description: INTERVENTIONS  - Assess and document dressing, incision, wound bed, drain sites and surrounding tissue  - Provide patient and family education  - Perform skin care/dressing changes every utcome: Progressing

## 2025-02-28 NOTE — ASSESSMENT & PLAN NOTE
Hx of recurrent bacterial vaginosis   Clue cells on wet mt on admission 2/23 as well as budding yeast

## 2025-02-28 NOTE — CASE MANAGEMENT
Case Management Progress Note    Patient name Yvette Siddiqui  Location /-01 MRN [id number]  : 1985 Date 2025       LOS (days): 4  Geometric Mean LOS (GMLOS) (days):   Days to GMLOS:        OBJECTIVE:        Current admission status: Inpatient  Preferred Pharmacy:   [facility]/pharmacy #[id number] - [facility] [address] - [address]  [address] [place] [address]  Phone: [phone] Fax: [phone]    Primary Care Provider: No primary care provider on file.    Primary Insurance: BLUE CROSS  Secondary Insurance:     PROGRESS NOTE:    CM received consult for [facility] requesting Spectra S2 for home use. Order placed to [employer] via [facility]. As per [employer], pump already shipped.  MOB will received pump delivered to home.

## 2025-03-01 PROBLEM — O47.00 PRETERM CONTRACTIONS: Status: RESOLVED | Noted: 2025-02-24 | Resolved: 2025-03-01

## 2025-03-01 LAB
ABO GROUP BLD BPU: NORMAL
BPU ID: NORMAL
CROSSMATCH: NORMAL
UNIT DISPENSE STATUS: NORMAL
UNIT PRODUCT CODE: NORMAL
UNIT PRODUCT VOLUME: 275 ML
UNIT PRODUCT VOLUME: 300 ML
UNIT PRODUCT VOLUME: 350 ML
UNIT PRODUCT VOLUME: 350 ML
UNIT RH: NORMAL

## 2025-03-01 PROCEDURE — 99024 POSTOP FOLLOW-UP VISIT: CPT | Performed by: STUDENT IN AN ORGANIZED HEALTH CARE EDUCATION/TRAINING PROGRAM

## 2025-03-01 RX ADMIN — IBUPROFEN 600 MG: 600 TABLET, FILM COATED ORAL at 11:43

## 2025-03-01 RX ADMIN — IBUPROFEN 600 MG: 600 TABLET, FILM COATED ORAL at 17:37

## 2025-03-01 RX ADMIN — METRONIDAZOLE 500 MG: 500 TABLET ORAL at 21:10

## 2025-03-01 RX ADMIN — DOCUSATE SODIUM 100 MG: 100 CAPSULE, LIQUID FILLED ORAL at 09:06

## 2025-03-01 RX ADMIN — METRONIDAZOLE 500 MG: 500 TABLET ORAL at 09:06

## 2025-03-01 RX ADMIN — ACETAMINOPHEN 975 MG: 325 TABLET, FILM COATED ORAL at 09:05

## 2025-03-01 RX ADMIN — ACETAMINOPHEN 975 MG: 325 TABLET, FILM COATED ORAL at 21:10

## 2025-03-01 RX ADMIN — ACETAMINOPHEN 975 MG: 325 TABLET, FILM COATED ORAL at 15:08

## 2025-03-01 RX ADMIN — IBUPROFEN 600 MG: 600 TABLET, FILM COATED ORAL at 23:44

## 2025-03-01 RX ADMIN — IBUPROFEN 600 MG: 600 TABLET, FILM COATED ORAL at 05:47

## 2025-03-01 RX ADMIN — DOCUSATE SODIUM 100 MG: 100 CAPSULE, LIQUID FILLED ORAL at 17:37

## 2025-03-01 RX ADMIN — ACETAMINOPHEN 975 MG: 325 TABLET, FILM COATED ORAL at 03:01

## 2025-03-01 NOTE — PLAN OF CARE
Problem: PAIN - ADULT  Goal: Verbalizes/displays adequate comfort level or baseline comfort level  Description: Interventions:  - Encourage patient to monitor pain and request assistance  - Assess pain using appropriate pain scale  - Administer analgesics based on type and severity of pain and evaluate response  - Implement non-pharmacological measures as appropriate and evaluate response  - Consider cultural and social influences on pain and pain management  - Notify physician/advanced practitioner if interventions unsuccessful or patient reports new pain  Outcome: Progressing     Problem: INFECTION - ADULT  Goal: Absence or prevention of progression during hospitalization  Description: INTERVENTIONS:  - Assess and monitor for signs and symptoms of infection  - Monitor lab/diagnostic results  - Monitor all insertion sites, i.e. indwelling lines, tubes, and drains  - Monitor endotracheal if appropriate and nasal secretions for changes in amount and color  - [place] appropriate cooling/warming therapies per order  - Administer medications as ordered  - Instruct and encourage patient and family to use good hand hygiene technique  - Identify and instruct in appropriate isolation precautions for identified infection/condition  Outcome: Progressing  Goal: Absence of fever/infection during neutropenic period  Description: INTERVENTIONS:  - Monitor WBC    Outcome: Progressing     Problem: SAFETY ADULT  Goal: Patient will remain free of falls  Description: INTERVENTIONS:  - Educate patient/family on patient safety including physical limitations  - Instruct patient to call for assistance with activity   - Consult OT/PT to assist with strengthening/mobility   - Keep Call bell within reach  - Keep bed low and locked with side rails adjusted as appropriate  - Keep care items and personal belongings within reach  - Initiate and maintain comfort rounds  - Make Fall Risk Sign visible to staff  - Apply yellow socks and bracelet  for high fall risk patients  - Consider moving patient to room near nurses station  Outcome: Progressing  Goal: Maintain or return to baseline ADL function  Description: INTERVENTIONS:  -  Assess patient's ability to carry out ADLs; assess patient's baseline for ADL function and identify physical deficits which impact ability to perform ADLs (bathing, care of mouth/teeth, toileting, grooming, dressing, etc.)  - Assess/evaluate cause of self-care deficits   - Assess range of motion  - Assess patient's mobility; develop plan if impaired  - Assess patient's need for assistive devices and provide as appropriate  - Encourage maximum independence but intervene and supervise when necessary  - Involve family in performance of ADLs  - Assess for home care needs following discharge   - Consider OT consult to assist with ADL evaluation and planning for discharge  - Provide patient education as appropriate  Outcome: Progressing  Goal: Maintains/Returns to pre admission functional level  Description: INTERVENTIONS:  - Perform AM-PAC 6 Click Basic Mobility/ Daily Activity assessment daily.  - Set and communicate daily mobility goal to care team and patient/family/caregiver.   - Collaborate with rehabilitation services on mobility goals if consulted  - Out of bed for toileting  - Record patient progress and toleration of activity level   Outcome: Progressing     Problem: DISCHARGE PLANNING  Goal: Discharge to home or other facility with appropriate resources  Description: INTERVENTIONS:  - Identify barriers to discharge w/patient and caregiver  - Arrange for needed discharge resources and transportation as appropriate  - Identify discharge learning needs (meds, wound care, etc.)  - Arrange for interpretive services to assist at discharge as needed  - Refer to Case Management Department for coordinating discharge planning if the patient needs post-hospital services based on physician/advanced practitioner order or complex needs  related to functional status, cognitive ability, or social support system  Outcome: Progressing     Problem: Knowledge Deficit  Goal: Patient/family/caregiver demonstrates understanding of disease process, treatment plan, medications, and discharge instructions  Description: Complete learning assessment and assess knowledge base.  Interventions:  - Provide teaching at level of understanding  - Provide teaching via preferred learning methods  Outcome: Progressing     Problem: POSTPARTUM  Goal: Experiences normal postpartum course  Description: INTERVENTIONS:  - Monitor maternal vital signs  - Assess uterine involution and lochia  Outcome: Progressing  Goal: Appropriate maternal -  bonding  Description: INTERVENTIONS:  - Identify family support  - Assess for appropriate maternal/infant bonding   -Encourage maternal/infant bonding opportunities  - Referral to  or  as needed  Outcome: Progressing  Goal: Establishment of infant feeding pattern  Description: INTERVENTIONS:  - Assess breast/bottle feeding  - Refer to lactation as needed  Outcome: Progressing  Goal: Incision(s), wounds(s) or drain site(s) healing without S/S of infection  Description: INTERVENTIONS  - Assess and document dressing, incision, wound bed, drain sites and surrounding tissue  - Provide patient and family education  - Perform skin care/dressing changes every  Outcome: Progressing

## 2025-03-01 NOTE — ASSESSMENT & PLAN NOTE
, s/p TXA, Hgb 10.9 -> 8.7, Venofer > 8.4  Passed void trial  Pain: Tylenol and toradol scheduled, junaid 5/10 PRN    FEN: Tolerating regular diet  DVT ppx: SCDs   Passing flatus   Incision C/D/I

## 2025-03-01 NOTE — PLAN OF CARE
Problem: PAIN - ADULT  Goal: Verbalizes/displays adequate comfort level or baseline comfort level  Description: Interventions:  - Encourage patient to monitor pain and request assistance  - Assess pain using appropriate pain scale  - Administer analgesics based on type and severity of pain and evaluate response  - Implement non-pharmacological measures as appropriate and evaluate response  - Consider cultural and social influences on pain and pain management  - Notify physician/advanced practitioner if interventions unsuccessful or patient reports new pain  Outcome: Progressing     Problem: INFECTION - ADULT  Goal: Absence or prevention of progression during hospitalization  Description: INTERVENTIONS:  - Assess and monitor for signs and symptoms of infection  - Monitor lab/diagnostic results  - Monitor all insertion sites, i.e. indwelling lines, tubes, and drains  - Monitor endotracheal if appropriate and nasal secretions for changes in amount and color  - [place] appropriate cooling/warming therapies per order  - Administer medications as ordered  - Instruct and encourage patient and family to use good hand hygiene technique  - Identify and instruct in appropriate isolation precautions for identified infection/condition  Outcome: Progressing  Goal: Absence of fever/infection during neutropenic period  Description: INTERVENTIONS:  - Monitor WBC    Outcome: Progressing     Problem: SAFETY ADULT  Goal: Patient will remain free of falls  Description: INTERVENTIONS:  - Educate patient/family on patient safety including physical limitations  - Instruct patient to call for assistance with activity   - Consult OT/PT to assist with strengthening/mobility   - Keep Call bell within reach  - Keep bed low and locked with side rails adjusted as appropriate  - Keep care items and personal belongings within reach  - Initiate and maintain comfort rounds  - Make Fall Risk Sign visible to staff  - Apply yellow socks and bracelet  for high fall risk patients  - Consider moving patient to room near nurses station  Outcome: Progressing  Goal: Maintain or return to baseline ADL function  Description: INTERVENTIONS:  -  Assess patient's ability to carry out ADLs; assess patient's baseline for ADL function and identify physical deficits which impact ability to perform ADLs (bathing, care of mouth/teeth, toileting, grooming, dressing, etc.)  - Assess/evaluate cause of self-care deficits   - Assess range of motion  - Assess patient's mobility; develop plan if impaired  - Assess patient's need for assistive devices and provide as appropriate  - Encourage maximum independence but intervene and supervise when necessary  - Involve family in performance of ADLs  - Assess for home care needs following discharge   - Consider OT consult to assist with ADL evaluation and planning for discharge  - Provide patient education as appropriate  Outcome: Progressing  Goal: Maintains/Returns to pre admission functional level  Description: INTERVENTIONS:  - Perform AM-PAC 6 Click Basic Mobility/ Daily Activity assessment daily.  - Set and communicate daily mobility goal to care team and patient/family/caregiver.   - Collaborate with rehabilitation services on mobility goals if consulted  - Out of bed for toileting  - Record patient progress and toleration of activity level   Outcome: Progressing     Problem: DISCHARGE PLANNING  Goal: Discharge to home or other facility with appropriate resources  Description: INTERVENTIONS:  - Identify barriers to discharge w/patient and caregiver  - Arrange for needed discharge resources and transportation as appropriate  - Identify discharge learning needs (meds, wound care, etc.)  - Arrange for interpretive services to assist at discharge as needed  - Refer to Case Management Department for coordinating discharge planning if the patient needs post-hospital services based on physician/advanced practitioner order or complex needs  related to functional status, cognitive ability, or social support system  Outcome: Progressing     Problem: Knowledge Deficit  Goal: Patient/family/caregiver demonstrates understanding of disease process, treatment plan, medications, and discharge instructions  Description: Complete learning assessment and assess knowledge base.  Interventions:  - Provide teaching at level of understanding  - Provide teaching via preferred learning methods  Outcome: Progressing     Problem: POSTPARTUM  Goal: Experiences normal postpartum course  Description: INTERVENTIONS:  - Monitor maternal vital signs  - Assess uterine involution and lochia  Outcome: Progressing  Goal: Appropriate maternal -  bonding  Description: INTERVENTIONS:  - Identify family support  - Assess for appropriate maternal/infant bonding   -Encourage maternal/infant bonding opportunities  - Referral to  or  as needed  Outcome: Progressing  Goal: Establishment of infant feeding pattern  Description: INTERVENTIONS:  - Assess breast/bottle feeding  - Refer to lactation as needed  Outcome: Progressing  Goal: Incision(s), wounds(s) or drain site(s) healing without S/S of infection  Description: INTERVENTIONS  - Assess and document dressing, incision, wound bed, drain sites and surrounding tissue  - Provide patient and family education  - Perform skin care q24hr. Dressing to remain in place.  Outcome: Progressing

## 2025-03-01 NOTE — PROGRESS NOTES
Progress Note - OB/GYN   Name: Yvette Siddiqui 39 y.o. female I MRN: [id number]  Unit/Bed#: -01 I Date of Admission: 2025   Date of Service: 3/1/2025 I Hospital Day: 5     Assessment & Plan  Gonorrhea  Hx of gonorrhea tx 2024  Negative HANK on 24  GC collected on admission   Status post bilateral salpingectomy  Performed at time of cesaraen  S/P repeat low transverse   , s/p TXA, Hgb 10.9 -> 8.7, Venofer > 8.4  Passed void trial  Pain: Tylenol and toradol scheduled, junadi 5/10 PRN    FEN: Tolerating regular diet  DVT ppx: SCDs   Passing flatus   Incision C/D/I     Vaginitis  Hx of recurrent bacterial vaginosis   Clue cells on wet mt on admission  as well as budding yeast  Status post Diflucan patient is a lot of pain in her vagina no  Scoliosis      OB Post-Partum Progress Note    Subjective:    Yvette Siddiqui has no current complaints and had no acute events overnight. Her pain is well controlled. She is currently voiding. She is ambulating. Patient is currently passing flatus and has had no a bowel movement. She is tolerating PO, and denies nausea or vomitting. She denies fever, chills, chest pain, shortness of breath, or calf tenderness. Lochia is minimal. She is Breastfeeding.     Objective :  Temp:  [97.6 °F (36.4 °C)-98.6 °F (37 °C)] 98.6 °F (37 °C)  HR:  [80-98] 98  BP: (102-109)/(57-72) 108/58  Resp:  [18-20] 18  SpO2:  [96 %-98 %] 96 %  O2 Device: None (Room air)    Physical Exam  GEN: Yvette Siddiqui appears well, alert and oriented x 3, pleasant and cooperative   CARDIO: Regular Rate  RESP:  Non-labored breathing  ABDOMEN: soft, appropriate tenderness, firm fundus below umbilicus, Incision C/D/I   EXTREMITIES: Non tender, trace edema appropriate for post-pregnancy, no erythema       Lab Results: I have reviewed the following results:  Lab Results   Component Value Date    WBC 10.24 (H) 2025    HGB 8.7 (L) 2025    HCT 27.3 (L) 2025    MCV 86  02/28/2025     02/28/2025

## 2025-03-01 NOTE — ASSESSMENT & PLAN NOTE
Hx of recurrent bacterial vaginosis   Clue cells on wet mt on admission 2/23 as well as budding yeast  Status post Diflucan patient is a lot of pain in her vagina no

## 2025-03-02 VITALS
DIASTOLIC BLOOD PRESSURE: 75 MMHG | BODY MASS INDEX: 29.95 KG/M2 | HEIGHT: 63 IN | RESPIRATION RATE: 16 BRPM | HEART RATE: 89 BPM | SYSTOLIC BLOOD PRESSURE: 118 MMHG | WEIGHT: 169 LBS | TEMPERATURE: 97.8 F | OXYGEN SATURATION: 97 %

## 2025-03-02 PROCEDURE — NC001 PR NO CHARGE: Performed by: OBSTETRICS & GYNECOLOGY

## 2025-03-02 PROCEDURE — 99024 POSTOP FOLLOW-UP VISIT: CPT | Performed by: OBSTETRICS & GYNECOLOGY

## 2025-03-02 RX ORDER — METRONIDAZOLE 500 MG/1
500 TABLET ORAL EVERY 12 HOURS SCHEDULED
Qty: 2 TABLET | Refills: 0 | Status: SHIPPED | OUTPATIENT
Start: 2025-03-02 | End: 2025-03-03

## 2025-03-02 RX ORDER — ACETAMINOPHEN 325 MG/1
975 TABLET ORAL EVERY 6 HOURS SCHEDULED
Qty: 3 TABLET | Refills: 0 | Status: SHIPPED | OUTPATIENT
Start: 2025-03-02 | End: 2025-03-03

## 2025-03-02 RX ORDER — IBUPROFEN 600 MG/1
600 TABLET, FILM COATED ORAL EVERY 6 HOURS
Qty: 30 TABLET | Refills: 0 | Status: SHIPPED | OUTPATIENT
Start: 2025-03-02

## 2025-03-02 RX ADMIN — ACETAMINOPHEN 975 MG: 325 TABLET, FILM COATED ORAL at 03:15

## 2025-03-02 RX ADMIN — IBUPROFEN 600 MG: 600 TABLET, FILM COATED ORAL at 05:32

## 2025-03-02 RX ADMIN — METRONIDAZOLE 500 MG: 500 TABLET ORAL at 11:09

## 2025-03-02 RX ADMIN — IBUPROFEN 600 MG: 600 TABLET, FILM COATED ORAL at 11:05

## 2025-03-02 RX ADMIN — DOCUSATE SODIUM 100 MG: 100 CAPSULE, LIQUID FILLED ORAL at 11:05

## 2025-03-02 NOTE — PLAN OF CARE
Problem: PAIN - ADULT  Goal: Verbalizes/displays adequate comfort level or baseline comfort level  Description: Interventions:  - Encourage patient to monitor pain and request assistance  - Assess pain using appropriate pain scale  - Administer analgesics based on type and severity of pain and evaluate response  - Implement non-pharmacological measures as appropriate and evaluate response  - Consider cultural and social influences on pain and pain management  - Notify physician/advanced practitioner if interventions unsuccessful or patient reports new pain  Outcome: Progressing     Problem: INFECTION - ADULT  Goal: Absence or prevention of progression during hospitalization  Description: INTERVENTIONS:  - Assess and monitor for signs and symptoms of infection  - Monitor lab/diagnostic results  - Monitor all insertion sites, i.e. indwelling lines, tubes, and drains  - Monitor endotracheal if appropriate and nasal secretions for changes in amount and color  - [place] appropriate cooling/warming therapies per order  - Administer medications as ordered  - Instruct and encourage patient and family to use good hand hygiene technique  - Identify and instruct in appropriate isolation precautions for identified infection/condition  Outcome: Progressing     Problem: SAFETY ADULT  Goal: Patient will remain free of falls  Description: INTERVENTIONS:  - Educate patient/family on patient safety including physical limitations  - Instruct patient to call for assistance with activity   - Consult OT/PT to assist with strengthening/mobility   - Keep Call bell within reach  - Keep bed low and locked with side rails adjusted as appropriate  - Keep care items and personal belongings within reach  - Initiate and maintain comfort rounds  - Make Fall Risk Sign visible to staff  - Apply yellow socks and bracelet for high fall risk patients  - Consider moving patient to room near nurses station  Outcome: Progressing  Goal: Maintain or  return to baseline ADL function  Description: INTERVENTIONS:  -  Assess patient's ability to carry out ADLs; assess patient's baseline for ADL function and identify physical deficits which impact ability to perform ADLs (bathing, care of mouth/teeth, toileting, grooming, dressing, etc.)  - Assess/evaluate cause of self-care deficits   - Assess range of motion  - Assess patient's mobility; develop plan if impaired  - Assess patient's need for assistive devices and provide as appropriate  - Encourage maximum independence but intervene and supervise when necessary  - Involve family in performance of ADLs  - Assess for home care needs following discharge   - Consider OT consult to assist with ADL evaluation and planning for discharge  - Provide patient education as appropriate  Outcome: Progressing  Goal: Maintains/Returns to pre admission functional level  Description: INTERVENTIONS:  - Perform AM-PAC 6 Click Basic Mobility/ Daily Activity assessment daily.  - Set and communicate daily mobility goal to care team and patient/family/caregiver.   - Collaborate with rehabilitation services on mobility goals if consulted  - Out of bed for toileting  - Record patient progress and toleration of activity level   Outcome: Progressing     Problem: DISCHARGE PLANNING  Goal: Discharge to home or other facility with appropriate resources  Description: INTERVENTIONS:  - Identify barriers to discharge w/patient and caregiver  - Arrange for needed discharge resources and transportation as appropriate  - Identify discharge learning needs (meds, wound care, etc.)  - Arrange for interpretive services to assist at discharge as needed  - Refer to Case Management Department for coordinating discharge planning if the patient needs post-hospital services based on physician/advanced practitioner order or complex needs related to functional status, cognitive ability, or social support system  Outcome: Progressing     Problem: Knowledge  Deficit  Goal: Patient/family/caregiver demonstrates understanding of disease process, treatment plan, medications, and discharge instructions  Description: Complete learning assessment and assess knowledge base.  Interventions:  - Provide teaching at level of understanding  - Provide teaching via preferred learning methods  Outcome: Progressing     Problem: POSTPARTUM  Goal: Experiences normal postpartum course  Description: INTERVENTIONS:  - Monitor maternal vital signs  - Assess uterine involution and lochia  Outcome: Progressing  Goal: Appropriate maternal -  bonding  Description: INTERVENTIONS:  - Identify family support  - Assess for appropriate maternal/infant bonding   -Encourage maternal/infant bonding opportunities  - Referral to  or  as needed  Outcome: Progressing  Goal: Establishment of infant feeding pattern  Description: INTERVENTIONS:  - Assess breast/bottle feeding  - Refer to lactation as needed  Outcome: Progressing  Goal: Incision(s), wounds(s) or drain site(s) healing without S/S of infection  Description: INTERVENTIONS  - Assess and document dressing, incision, wound bed, drain sites and surrounding tissue  - Provide patient and family education  Outcome: Progressing     Problem: INFECTION - ADULT  Goal: Absence of fever/infection during neutropenic period  Description: INTERVENTIONS:  - Monitor WBC    Outcome: Completed

## 2025-03-02 NOTE — CONSULTS
Discharge Lactation    Met with Yvette who is pumping for her baby girl in NICU and is anticipating discharge home today. Yvette reports that pumping is uncomfortable. Encouraged lowering her suction settings while pumping to comfortable levels at any time while pumping. Parents report that baby is now being offered bottles in the NICU; encouraged speaking with baby's care team about offering breastfeeds as well.     Encouraged reaching out for lactation support while baby is in NICU. Parents have no further questions at this time.

## 2025-03-02 NOTE — PLAN OF CARE
Problem: PAIN - ADULT  Goal: Verbalizes/displays adequate comfort level or baseline comfort level  Description: Interventions:  - Encourage patient to monitor pain and request assistance  - Assess pain using appropriate pain scale  - Administer analgesics based on type and severity of pain and evaluate response  - Implement non-pharmacological measures as appropriate and evaluate response  - Consider cultural and social influences on pain and pain management  - Notify physician/advanced practitioner if interventions unsuccessful or patient reports new pain  3/2/2025 1841 by Audelia Mulligan RN  Outcome: Adequate for Discharge  3/2/2025 1841 by Audelia Mulligan RN  Outcome: Adequate for Discharge     Problem: INFECTION - ADULT  Goal: Absence or prevention of progression during hospitalization  Description: INTERVENTIONS:  - Assess and monitor for signs and symptoms of infection  - Monitor lab/diagnostic results  - Monitor all insertion sites, i.e. indwelling lines, tubes, and drains  - Monitor endotracheal if appropriate and nasal secretions for changes in amount and color  - [place] appropriate cooling/warming therapies per order  - Administer medications as ordered  - Instruct and encourage patient and family to use good hand hygiene technique  - Identify and instruct in appropriate isolation precautions for identified infection/condition  3/2/2025 1841 by Audelia Mulligan RN  Outcome: Adequate for Discharge  3/2/2025 1841 by Audelia Mulligan RN  Outcome: Adequate for Discharge     Problem: SAFETY ADULT  Goal: Patient will remain free of falls  Description: INTERVENTIONS:  - Educate patient/family on patient safety including physical limitations  - Instruct patient to call for assistance with activity   - Consult OT/PT to assist with strengthening/mobility   - Keep Call bell within reach  - Keep bed low and locked with side rails adjusted as appropriate  - Keep care items and personal belongings within reach  -  Initiate and maintain comfort rounds  - Make Fall Risk Sign visible to staff  - Apply yellow socks and bracelet for high fall risk patients  - Consider moving patient to room near nurses station  3/2/2025 1841 by Audelia Mulligan RN  Outcome: Adequate for Discharge  3/2/2025 1841 by Audelia Mulligan RN  Outcome: Adequate for Discharge  Goal: Maintain or return to baseline ADL function  Description: INTERVENTIONS:  -  Assess patient's ability to carry out ADLs; assess patient's baseline for ADL function and identify physical deficits which impact ability to perform ADLs (bathing, care of mouth/teeth, toileting, grooming, dressing, etc.)  - Assess/evaluate cause of self-care deficits   - Assess range of motion  - Assess patient's mobility; develop plan if impaired  - Assess patient's need for assistive devices and provide as appropriate  - Encourage maximum independence but intervene and supervise when necessary  - Involve family in performance of ADLs  - Assess for home care needs following discharge   - Consider OT consult to assist with ADL evaluation and planning for discharge  - Provide patient education as appropriate  3/2/2025 1841 by Audelia Mulligan RN  Outcome: Adequate for Discharge  3/2/2025 1841 by Audelia Mulligan RN  Outcome: Adequate for Discharge  Goal: Maintains/Returns to pre admission functional level  Description: INTERVENTIONS:  - Perform AM-PAC 6 Click Basic Mobility/ Daily Activity assessment daily.  - Set and communicate daily mobility goal to care team and patient/family/caregiver.   - Collaborate with rehabilitation services on mobility goals if consulted  - Out of bed for toileting  - Record patient progress and toleration of activity level   3/2/2025 1841 by Audelia Mulligan RN  Outcome: Adequate for Discharge  3/2/2025 1841 by Audelia Mulligan RN  Outcome: Adequate for Discharge     Problem: DISCHARGE PLANNING  Goal: Discharge to home or other facility with appropriate  resources  Description: INTERVENTIONS:  - Identify barriers to discharge w/patient and caregiver  - Arrange for needed discharge resources and transportation as appropriate  - Identify discharge learning needs (meds, wound care, etc.)  - Arrange for interpretive services to assist at discharge as needed  - Refer to Case Management Department for coordinating discharge planning if the patient needs post-hospital services based on physician/advanced practitioner order or complex needs related to functional status, cognitive ability, or social support system  3/2/2025 1841 by Audelia Mulligan RN  Outcome: Adequate for Discharge  3/2/2025 1841 by Audelia Mulligan RN  Outcome: Adequate for Discharge     Problem: Knowledge Deficit  Goal: Patient/family/caregiver demonstrates understanding of disease process, treatment plan, medications, and discharge instructions  Description: Complete learning assessment and assess knowledge base.  Interventions:  - Provide teaching at level of understanding  - Provide teaching via preferred learning methods  3/2/2025 1841 by Audelia Mulligan RN  Outcome: Adequate for Discharge  3/2/2025 1841 by Audelia Mulligan RN  Outcome: Adequate for Discharge     Problem: POSTPARTUM  Goal: Experiences normal postpartum course  Description: INTERVENTIONS:  - Monitor maternal vital signs  - Assess uterine involution and lochia  3/2/2025 1841 by Audelia Mulligan RN  Outcome: Adequate for Discharge  3/2/2025 1841 by Audelia Mulligan RN  Outcome: Adequate for Discharge  Goal: Appropriate maternal -  bonding  Description: INTERVENTIONS:  - Identify family support  - Assess for appropriate maternal/infant bonding   -Encourage maternal/infant bonding opportunities  - Referral to  or  as needed  3/2/2025 1841 by Audelia Mulligan RN  Outcome: Adequate for Discharge  3/2/2025 1841 by Audelia Mulligan RN  Outcome: Adequate for Discharge  Goal: Establishment of infant feeding  pattern  Description: INTERVENTIONS:  - Assess breast/bottle feeding  - Refer to lactation as needed  3/2/2025 1841 by Audelia Mulligan RN  Outcome: Adequate for Discharge  3/2/2025 1841 by Audelia Mulligan RN  Outcome: Adequate for Discharge  Goal: Incision(s), wounds(s) or drain site(s) healing without S/S of infection  Description: INTERVENTIONS  - Assess and document dressing, incision, wound bed, drain sites and surrounding tissue  - Provide patient and family education  - Perform skin care/dressing changes every day  3/2/2025 1841 by Audelia Mulligan RN  Outcome: Adequate for Discharge  3/2/2025 1841 by Audelia Mulligan RN  Outcome: Adequate for Discharge

## 2025-03-02 NOTE — PROGRESS NOTES
Progress Note - OB/GYN   Name: Yvette Siddiqui 39 y.o. female I MRN: [id number]  Unit/Bed#: -01 I Date of Admission: 2025   Date of Service: 3/2/2025 I Hospital Day: 6     Assessment & Plan  Gonorrhea  Hx of gonorrhea tx 2024  Negative HANK on 24  GC collected on admission   Status post bilateral salpingectomy  Performed at time of cesaraen  S/P repeat low transverse   , s/p TXA, Hgb 10.9 -> 8.7, Venofer > 8.4  Passed void trial  Pain: Tylenol and toradol scheduled, junaid 5/10 PRN    FEN: Tolerating regular diet  DVT ppx: SCDs   Passing flatus   Incision C/D/I     Vaginitis  Hx of recurrent bacterial vaginosis   Clue cells on wet mt on admission  as well as budding yeast  Status post Diflucan patient is a lot of pain in her vagina no  Scoliosis      OB Post-Partum Progress Note    Subjective:    Yvette Siddiqui has no current complaints and had no acute events overnight. Her pain is well controlled. She is currently voiding. She is ambulating. Patient is currently passing flatus and has had a bowel movement. She is tolerating PO, and denies nausea or vomitting. She denies fever, chills, chest pain, shortness of breath, or calf tenderness. Lochia is minimal. She is Breastfeeding.     Objective :  Temp:  [97.6 °F (36.4 °C)-98.6 °F (37 °C)] 97.6 °F (36.4 °C)  HR:  [87-98] 88  BP: (108-113)/(56-66) 108/66  Resp:  [18] 18  SpO2:  [96 %-97 %] 97 %  O2 Device: None (Room air)    Physical Exam  GEN: Yvette Siddiqui appears well, alert and oriented x 3, pleasant and cooperative   CARDIO: Regular Rate  RESP:  Non-labored breathing  ABDOMEN: soft, appropriate tenderness, firm fundus below umbilicus, Incision C/D/I   EXTREMITIES: Non tender, trace edema appropriate for post-pregnancy, no erythema       Lab Results: I have reviewed the following results:  Lab Results   Component Value Date    WBC 10.24 (H) 2025    HGB 8.7 (L) 2025    HCT 27.3 (L) 2025    MCV 86  02/28/2025     02/28/2025

## 2025-03-03 ENCOUNTER — LACTATION ENCOUNTER (OUTPATIENT)
Dept: OTHER | Facility: HOSPITAL | Age: 40
End: 2025-03-03

## 2025-03-03 NOTE — UTILIZATION REVIEW
NOTIFICATION OF ADMISSION DISCHARGE   This is a Notification of Discharge from [facility]. Please be advised that this patient has been discharge from our facility. Below you will find the admission and discharge date and time including the patient’s disposition.   UTILIZATION REVIEW CONTACT:  Char Vitale  Utilization   Network Utilization Review Department  Phone: [phone] x carefully listen to the prompts. All voicemails are confidential.  Email: [email or web address]     ADMISSION INFORMATION  PRESENTATION DATE: 2/23/2025 11:24 PM  OBERVATION ADMISSION DATE: N/A  INPATIENT ADMISSION DATE: 2/24/25 12:25 AM   DISCHARGE DATE: 3/2/2025  7:20 PM   DISPOSITION:Home/Self Care    Network Utilization Review Department  ATTENTION: Please call with any questions or concerns to [phone] and carefully listen to the prompts so that you are directed to the right person. All voicemails are confidential.   For Discharge needs, contact Care Management DC Support Team at [phone] opt. 2  Send all requests for admission clinical reviews, approved or denied determinations and any other requests to dedicated fax number below belonging to the campus where the patient is receiving treatment. List of dedicated fax numbers for the Facilities:  FACILITY NAME UR FAX NUMBER   ADMISSION DENIALS (Administrative/Medical Necessity) [phone]   DISCHARGE SUPPORT TEAM ([facility]) [phone]   PARENT CHILD HEALTH (Maternity/NICU/Pediatrics) [phone]   [facility] [phone]   [facility] [phone]   [facility] [phone]   [facility] [phone]   [facility] [phone]   [facility] [phone]   [facility] [phone]   [facility] [phone]    [facility] [phone]   Critical access hospital [phone]   [facility] [phone]   [facility] [phone]

## 2025-03-03 NOTE — LACTATION NOTE
This note was copied from a baby's chart.  CONSULT - LACTATION  Baby Girl Siddiqui (Evelyn) 4 days female MRN: [id number]    [facility] NICU Room / Bed: NICU 16/NICU 16 Encounter: [id number]    Maternal Information     MOTHER:  Yvette Siddiqui  Maternal Age: 39 y.o.  OB History: # 1 - Date: 07, Sex: Male, Weight: 3515 g (7 lb 12 oz), GA: 40w0d, Type: , Low Transverse, Apgar1: None, Apgar5: None, Living: Living, Birth Comments: C/s for fetal intolerence    # 2 - Date: 08, Sex: Female, Weight: 3175 g (7 lb), GA: 39w0d, Type: , Unspecified, Apgar1: None, Apgar5: None, Living: Living, [facility]    # 3 - Date: , Sex: None, Weight: None, GA: 8w0d, Type: None, Apgar1: None, Apgar5: None, Living: None, Birth Comments: naturally- procedure    # 4 - Date: 19, Sex: Female, Weight: 3175 g (7 lb), GA: 39w0d, Type: , Low Transverse, Apgar1: None, Apgar5: None, Living: Living, [facility]    # 5 - Date: 25, Sex: Female, Weight: 1925 g (4 lb 3.9 oz), GA: 34w0d, Type: , Low Transverse, Apgar1: 8, Apgar5: 9, Living: Living, Birth Comments: None   Previous breast reduction surgery? No    Lactation history:   Has patient previously breast fed:     How long had patient previously breast fed:     Previous breast feeding complications:       Past Surgical History:   Procedure Laterality Date    APPENDECTOMY       SECTION      GALLBLADDER SURGERY      KNEE SURGERY Right     UT  DELIVERY ONLY N/A 2025    Procedure:  SECTION () REPEAT;  Surgeon: Nancy Solo MD;  Location: [facility] ;  Service: Obstetrics    SALPINGECTOMY N/A 2025    Procedure: SALPINGECTOMY;  Surgeon: Nancy Solo MD;  Location: [facility] ;  Service: Obstetrics       Birth information:  YOB: [birth year]25   Time of birth: 9:06 AM   Sex: female   Delivery type: , Low Transverse   Birth Weight:  1925 g (4 lb 3.9 oz)   Percent of Weight Change: -3%     Gestational Age: 34w0d   [unfilled]    Reason for Consult:    Reason for Consult: Follow-up assessment (routine) -10 min, Latch Assess (routine) - 10 min    Risk Factors:         Breast and nipple assessment:   Breasts/Nipples  Left Breast: Filling  Right Breast: Filling  Left Nipple: Everted  Right Nipple: Everted  Intervention: Hand expression  Breastfeeding Status: Yes  Breastfeeding Progress: Not yet established     Assessment:  sleepy, tired after 1 minute during feeding. Rests and then resumes repeating same pattern.    Feeding assessment:  see note above    LATCH:  Latch: Grasps breast, tongue down, lips flanged, rhythmic sucking   Audible Swallowing: A few with stimulation   Type of Nipple: Everted (After stimulation)   Comfort (Breast/Nipple): Soft/non-tender   Hold (Positioning): Partial assist, teach one side, mother does other, staff holds   LATCH Score: 8       Feeding recommendations:   breastfeed during care times, when baby is alert and cueing. Continue to pump after feedings to protect supply. Use breast compressions to help with flow and lay back.     Britney Balbuena RN 3/3/2025 3:28 PM

## 2025-03-05 PROCEDURE — 88307 TISSUE EXAM BY PATHOLOGIST: CPT | Performed by: PATHOLOGY

## 2025-03-05 PROCEDURE — 88302 TISSUE EXAM BY PATHOLOGIST: CPT | Performed by: PATHOLOGY

## 2025-03-14 PROBLEM — Z13.32 ENCOUNTER FOR SCREENING FOR MATERNAL DEPRESSION: Status: RESOLVED | Noted: 2024-11-27 | Resolved: 2025-03-14

## 2025-03-19 ENCOUNTER — POSTPARTUM VISIT (OUTPATIENT)
Age: 40
End: 2025-03-19
Payer: COMMERCIAL

## 2025-03-19 VITALS
SYSTOLIC BLOOD PRESSURE: 102 MMHG | DIASTOLIC BLOOD PRESSURE: 66 MMHG | WEIGHT: 156.4 LBS | HEIGHT: 63 IN | BODY MASS INDEX: 27.71 KG/M2

## 2025-03-19 NOTE — PROGRESS NOTES
"Yvette Siddiqui  1985    S:  39 y.o. female here for postpartum visit.  She is s/p  (rLTCS and BS) on 25 .       Gender: female   Apgars: 8/9  Weight: 4 lb 3 oz  Her lochia has resolved.    She is Breastfeeding without problems.   She is eating normally, denies constipation, diarrhea, urinary dysfunction.   She denies postpartum blues/depression.  Her EPDS is 1.      Last Pap: NILM/HPV neg 24    Pt had BS with  for contraception    O:   /66 (BP Location: Left arm, Patient Position: Sitting, Cuff Size: Adult)   Ht 5' 3\" (1.6 m)   Wt 70.9 kg (156 lb 6.4 oz)   LMP 2024 (Exact Date)   Breastfeeding Yes   BMI 27.71 kg/m²   She appears well and in no distress  Breasts are soft, nontender, without erythema  Abdomen is soft and nontender, incision well healed  Pelvic exam deferred    A/P:  Postpartum visit.     - She will return in 3-4 months for her yearly exam, sooner PRN.   - reviewed return to activity postpartum    "

## 2025-03-28 ENCOUNTER — OFFICE VISIT (OUTPATIENT)
Dept: URGENT CARE | Facility: MEDICAL CENTER | Age: 40
End: 2025-03-28
Payer: COMMERCIAL

## 2025-03-28 ENCOUNTER — NURSE TRIAGE (OUTPATIENT)
Age: 40
End: 2025-03-28

## 2025-03-28 VITALS
WEIGHT: 160 LBS | TEMPERATURE: 97.7 F | RESPIRATION RATE: 18 BRPM | OXYGEN SATURATION: 99 % | HEART RATE: 68 BPM | SYSTOLIC BLOOD PRESSURE: 120 MMHG | BODY MASS INDEX: 28.34 KG/M2 | DIASTOLIC BLOOD PRESSURE: 66 MMHG

## 2025-03-28 DIAGNOSIS — B37.2 CANDIDAL SKIN INFECTION: Primary | ICD-10-CM

## 2025-03-28 PROCEDURE — S9083 URGENT CARE CENTER GLOBAL: HCPCS | Performed by: FAMILY MEDICINE

## 2025-03-28 PROCEDURE — G0382 LEV 3 HOSP TYPE B ED VISIT: HCPCS | Performed by: FAMILY MEDICINE

## 2025-03-28 RX ORDER — NYSTATIN 100000 U/G
CREAM TOPICAL 2 TIMES DAILY
Qty: 30 G | Refills: 0 | Status: SHIPPED | OUTPATIENT
Start: 2025-03-28

## 2025-03-28 NOTE — PROGRESS NOTES
[facility]  Name: Yvette Siddiqui      : 1985      MRN: [id number]  Encounter Provider: Renata Le DO  Encounter Date: 3/28/2025   Encounter department: [facility] [place]  :  Assessment & Plan  Candidal skin infection    Orders:  •  nystatin (MYCOSTATIN) cream; Apply topically 2 (two) times a day        Patient Instructions  Follow up with PCP in 3-5 days.  Proceed to  ER if symptoms worsen.    If tests are performed, our office will contact you with results only if changes need to made to the care plan discussed with you at the visit. [facility]    Chief Complaint:   Chief Complaint   Patient presents with   • Rash     Pt. With a rash to her C -Section incision. Surgery was a month ago. Rash began 2 days ago.      History of Present Illness   2 days of itching around c section scar  Csection was on   Otherwise healing well  Feels itching and sees a rash in that area      Rash  Pertinent negatives include no fatigue, fever, shortness of breath or vomiting.         Review of Systems   Constitutional:  Negative for activity change, chills, fatigue and fever.   Respiratory:  Negative for shortness of breath.    Cardiovascular:  Negative for chest pain.   Gastrointestinal:  Negative for nausea and vomiting.   Skin:  Positive for rash.     Past Medical History   Past Medical History:   Diagnosis Date   • Antepartum multigravida of advanced maternal age 2024    HNL cfDNA ordered (wants to check w/ insurance)     • Gonorrhea 2023    solved   • Infectious viral hepatitis 05/10/2007    Hep A   • Miscarriage 2010   • Varicella 1997     Past Surgical History:   Procedure Laterality Date   • APPENDECTOMY     •  SECTION     • CHOLECYSTECTOMY  07/10/2008   • GALLBLADDER SURGERY     • KNEE SURGERY Right    • [place]  DELIVERY ONLY N/A 2025    Procedure:  SECTION () REPEAT;   "Surgeon: Nancy Solo MD;  [facility]: [facility];  Service: Obstetrics   • SALPINGECTOMY N/A 02/27/2025    Procedure: SALPINGECTOMY;  Surgeon: Nancy Solo MD;  Location: [facility];  Service: Obstetrics     Family History   Problem Relation Age of Onset   • Hypertension Mother         in treatment   • Breast cancer Neg Hx    • Colon cancer Neg Hx    • Ovarian cancer Neg Hx      she reports that she has never smoked. She has never used smokeless tobacco. She reports that she does not drink alcohol and does not use drugs.  Current Outpatient Medications   Medication Instructions   • ibuprofen (MOTRIN) 600 mg, Oral, Every 6 hours   • nystatin (MYCOSTATIN) cream Topical, 2 times daily   • witch hazel-glycerin (TUCKS) topical pad 1 Pad, Topical, Every 4 hours PRN   No Known Allergies     Objective   /66   Pulse 68   Temp 97.7 °F (36.5 °C)   Resp 18   Wt 72.6 kg (160 lb)   LMP 07/04/2024 (Exact Date)   SpO2 99%   BMI 28.34 kg/m²      Physical Exam  Vitals and nursing note reviewed.   Constitutional:       General: She is not in acute distress.     Appearance: Normal appearance. She is not ill-appearing or toxic-appearing.   HENT:      Head: Normocephalic and atraumatic.   Cardiovascular:      Rate and Rhythm: Normal rate and regular rhythm.   Pulmonary:      Effort: Pulmonary effort is normal. No respiratory distress.   Skin:     General: Skin is warm and dry.      Capillary Refill: Capillary refill takes less than 2 seconds.      Findings: Rash present.      Comments: Redness in crease under pannus with sattellite lesions and minor excoriations.  Incision is CDI   Neurological:      General: No focal deficit present.      Mental Status: She is alert and oriented to person, place, and time.   Psychiatric:         Mood and Affect: Mood normal.         Behavior: Behavior normal.         Portions of the record may have been created with voice recognition software.  Occasional wrong word or \"sound a " "like\" substitutions may have occurred due to the inherent limitations of voice recognition software.  Read the chart carefully and recognize, using context, where substitutions have occurred.  "

## 2025-03-28 NOTE — TELEPHONE ENCOUNTER
"FOLLOW UP:   Epic Secure Chat sent to Dr Rosen   [facility] Secure Chat received: urgent care    Pt was notified of the providers recommendations, pt stated she will go tomorrow as she doesn't have a vehicle, pt is advised to go now, pt verbalized understanding.     REASON FOR CONVERSATION: Post-op Problem    SYMPTOMS: Left side of incision, has redness and is itchy and has an odor. Pt saying that there may be drainage as she can't see the incision, pt thinks it feels \"wet\"    OTHER: Pt calling in saying that she had a  on 25, Left side of incision, has redness and is itchy and has an odor. Pt saying that there may be drainage as she can't see the incision, pt thinks it feels \"wet\". Pt has incisional pain 3/10.     DISPOSITION: urgent care          used for this call ID# [id number]    Answer Assessment - Initial Assessment Questions  1. SYMPTOM: \"What's the main symptom you're concerned about?\" (e.g., pain, fever, vomiting)     Left side of incision, has redness and is itchy and has an odor.   2. ONSET: \"When did redness   start?\"      3 or 4 days   3. DATE of : \"When was  performed?\"       25    5. PAIN: \"Is there any pain?\" If Yes, ask: \"How bad is it?\"  (Scale 1-10; or mild, moderate, severe)      Pain on incision 3/10  6. FEVER: \"Do you have a fever?\" If Yes, ask: \"What is your temperature, how was it measured, and when did it start?\"      Pt denies   7. VOMITING: \"Is there any vomiting?\" If Yes, ask: \"How many times?\"      Pt denies   8. INCISION: \"How does the incision site look?\" (e.g., gaping area, swelling, redness, foul odor) \"What material was used for closure?\" (e.g., sutures, staples, glue)      Pt saying that there may be drainage as she can't see the incision, pt thinks it feels \"wet\". And has an odor.   9. BLEEDING OR DRAINAGE: \"Is there any bleeding or drainage?\" If Yes, ask: \"How much?\", \"What color?\", and \"Where?\"      Pt unsure.   10. OTHER SYMPTOMS: \"Do " "you have any other symptoms?\" (e.g., drainage from wound, painful urination, constipation)        Pt reporting slight constipation, pt denies painful urination.    Protocols used: Postpartum -  Symptoms-Adult-AH    "

## 2025-05-07 ENCOUNTER — APPOINTMENT (OUTPATIENT)
Age: 40
End: 2025-05-07
Payer: COMMERCIAL

## 2025-05-07 ENCOUNTER — ANNUAL EXAM (OUTPATIENT)
Age: 40
End: 2025-05-07

## 2025-05-07 VITALS — SYSTOLIC BLOOD PRESSURE: 102 MMHG | BODY MASS INDEX: 27.85 KG/M2 | DIASTOLIC BLOOD PRESSURE: 70 MMHG | WEIGHT: 157.2 LBS

## 2025-05-07 DIAGNOSIS — K59.00 CONSTIPATION, UNSPECIFIED CONSTIPATION TYPE: ICD-10-CM

## 2025-05-07 DIAGNOSIS — K13.0 ANGULAR CHEILITIS: ICD-10-CM

## 2025-05-07 DIAGNOSIS — D50.9 IRON DEFICIENCY ANEMIA, UNSPECIFIED IRON DEFICIENCY ANEMIA TYPE: ICD-10-CM

## 2025-05-07 DIAGNOSIS — Z01.419 ENCOUNTER FOR ANNUAL ROUTINE GYNECOLOGICAL EXAMINATION: Primary | ICD-10-CM

## 2025-05-07 LAB
BASOPHILS # BLD AUTO: 0.06 THOUSANDS/ÂΜL (ref 0–0.1)
BASOPHILS NFR BLD AUTO: 1 % (ref 0–1)
EOSINOPHIL # BLD AUTO: 0.23 THOUSAND/ÂΜL (ref 0–0.61)
EOSINOPHIL NFR BLD AUTO: 3 % (ref 0–6)
ERYTHROCYTE [DISTWIDTH] IN BLOOD BY AUTOMATED COUNT: 15 % (ref 11.6–15.1)
FERRITIN SERPL-MCNC: 9 NG/ML (ref 30–307)
HCT VFR BLD AUTO: 39.9 % (ref 34.8–46.1)
HGB BLD-MCNC: 12.4 G/DL (ref 11.5–15.4)
IMM GRANULOCYTES # BLD AUTO: 0.02 THOUSAND/UL (ref 0–0.2)
IMM GRANULOCYTES NFR BLD AUTO: 0 % (ref 0–2)
LYMPHOCYTES # BLD AUTO: 2.82 THOUSANDS/ÂΜL (ref 0.6–4.47)
LYMPHOCYTES NFR BLD AUTO: 42 % (ref 14–44)
MCH RBC QN AUTO: 26.4 PG (ref 26.8–34.3)
MCHC RBC AUTO-ENTMCNC: 31.1 G/DL (ref 31.4–37.4)
MCV RBC AUTO: 85 FL (ref 82–98)
MONOCYTES # BLD AUTO: 0.53 THOUSAND/ÂΜL (ref 0.17–1.22)
MONOCYTES NFR BLD AUTO: 8 % (ref 4–12)
NEUTROPHILS # BLD AUTO: 3.14 THOUSANDS/ÂΜL (ref 1.85–7.62)
NEUTS SEG NFR BLD AUTO: 46 % (ref 43–75)
NRBC BLD AUTO-RTO: 0 /100 WBCS
PLATELET # BLD AUTO: 402 THOUSANDS/UL (ref 149–390)
PMV BLD AUTO: 10.6 FL (ref 8.9–12.7)
RBC # BLD AUTO: 4.69 MILLION/UL (ref 3.81–5.12)
WBC # BLD AUTO: 6.8 THOUSAND/UL (ref 4.31–10.16)

## 2025-05-07 PROCEDURE — 85025 COMPLETE CBC W/AUTO DIFF WBC: CPT

## 2025-05-07 PROCEDURE — 82728 ASSAY OF FERRITIN: CPT

## 2025-05-07 PROCEDURE — 36415 COLL VENOUS BLD VENIPUNCTURE: CPT

## 2025-05-08 ENCOUNTER — RESULTS FOLLOW-UP (OUTPATIENT)
Dept: OBGYN CLINIC | Facility: CLINIC | Age: 40
End: 2025-05-08

## (undated) DEVICE — SKIN MARKER DUAL TIP WITH RULER CAP, FLEXIBLE RULER AND LABELS: Brand: DEVON

## (undated) DEVICE — SUT VICRYL 0 CTX 36 IN J978H

## (undated) DEVICE — GAUZE SPONGES,16 PLY: Brand: CURITY

## (undated) DEVICE — ABDOMINAL PAD: Brand: DERMACEA

## (undated) DEVICE — SUT MONOCRYL 4-0 PS-2 27 IN Y426H

## (undated) DEVICE — WOUND RETRACTOR AND PROTECTOR: Brand: ALEXIS O WOUND PROTECTOR-RETRACTOR

## (undated) DEVICE — PACK C-SECTION PBDS

## (undated) DEVICE — GLOVE PI ULTRA TOUCH SZ 6

## (undated) DEVICE — Device

## (undated) DEVICE — SUT VICRYL 0 CT-1 36 IN J946H

## (undated) DEVICE — DRESSING MEPILEX AG BORDER POST-OP 4 X 12 IN

## (undated) DEVICE — SUT MONOCRYL 0 CTX 36 IN Y398H

## (undated) DEVICE — ENSEAL X1 TISSUE SEALER, CURVED JAW, 25 CM SHAFT LENGTH: Brand: ENSEAL

## (undated) DEVICE — DRESSING TELFA 2 X 3 IN STRL

## (undated) DEVICE — CHLORAPREP HI-LITE 26ML ORANGE

## (undated) DEVICE — SUT MONOCRYL 2-0 CT-1 36 IN Y945H